# Patient Record
Sex: MALE | Race: BLACK OR AFRICAN AMERICAN | Employment: OTHER | URBAN - METROPOLITAN AREA
[De-identification: names, ages, dates, MRNs, and addresses within clinical notes are randomized per-mention and may not be internally consistent; named-entity substitution may affect disease eponyms.]

---

## 2018-01-25 PROBLEM — I65.29 EXTERNAL CAROTID ARTERY STENOSIS: Status: ACTIVE | Noted: 2018-01-25

## 2023-07-29 PROBLEM — Z95.0 PACEMAKER: Status: ACTIVE | Noted: 2023-07-29

## 2023-10-31 ENCOUNTER — TELEPHONE (OUTPATIENT)
Dept: NEUROSURGERY | Age: 63
End: 2023-10-31

## 2023-10-31 NOTE — TELEPHONE ENCOUNTER
Patient called office after having stopped into office yesterday and stated he does not wish to do any conservative measures such as see pain management or physical therapy because he does not understand why or how this is going to help him. I did explain to the patient that if he did not complete this steps that his insurance would not approve for him to have insurance. Please contact as he wishes to speak with you further.      Thank you

## 2023-10-31 NOTE — TELEPHONE ENCOUNTER
Patient called, answered questions at length, rereviewed CT myelogram with patient. He does not wish to pursue pain management. He wishes to have an appointment with Dr. Rachel Lange. Please call patient to schedule.

## 2023-11-02 ENCOUNTER — OFFICE VISIT (OUTPATIENT)
Dept: PAIN MANAGEMENT | Age: 63
End: 2023-11-02
Payer: COMMERCIAL

## 2023-11-02 VITALS
TEMPERATURE: 97.9 F | SYSTOLIC BLOOD PRESSURE: 139 MMHG | BODY MASS INDEX: 29.29 KG/M2 | HEIGHT: 73 IN | WEIGHT: 221 LBS | OXYGEN SATURATION: 98 % | DIASTOLIC BLOOD PRESSURE: 81 MMHG | HEART RATE: 76 BPM | RESPIRATION RATE: 18 BRPM

## 2023-11-02 DIAGNOSIS — M54.42 CHRONIC BILATERAL LOW BACK PAIN WITH LEFT-SIDED SCIATICA: ICD-10-CM

## 2023-11-02 DIAGNOSIS — M54.16 LUMBAR RADICULOPATHY: ICD-10-CM

## 2023-11-02 DIAGNOSIS — G89.29 CHRONIC BILATERAL LOW BACK PAIN WITH LEFT-SIDED SCIATICA: ICD-10-CM

## 2023-11-02 DIAGNOSIS — M50.90 CERVICAL DISC DISORDER: ICD-10-CM

## 2023-11-02 DIAGNOSIS — M54.12 CERVICAL RADICULITIS: ICD-10-CM

## 2023-11-02 DIAGNOSIS — M54.2 CERVICALGIA: ICD-10-CM

## 2023-11-02 DIAGNOSIS — G89.4 CHRONIC PAIN SYNDROME: Primary | ICD-10-CM

## 2023-11-02 DIAGNOSIS — M43.16 SPONDYLOLISTHESIS OF LUMBAR REGION: ICD-10-CM

## 2023-11-02 PROCEDURE — 99204 OFFICE O/P NEW MOD 45 MIN: CPT | Performed by: PAIN MEDICINE

## 2023-11-02 PROCEDURE — 99205 OFFICE O/P NEW HI 60 MIN: CPT | Performed by: PAIN MEDICINE

## 2023-11-02 RX ORDER — TIZANIDINE 4 MG/1
4 TABLET ORAL EVERY 6 HOURS PRN
COMMUNITY

## 2023-11-03 PROBLEM — M54.2 CERVICALGIA: Status: ACTIVE | Noted: 2023-11-03

## 2023-11-03 PROBLEM — G89.4 CHRONIC PAIN SYNDROME: Status: ACTIVE | Noted: 2023-11-03

## 2023-11-03 PROBLEM — M54.12 CERVICAL RADICULITIS: Status: ACTIVE | Noted: 2023-11-03

## 2023-11-03 PROBLEM — M54.42 CHRONIC BILATERAL LOW BACK PAIN WITH LEFT-SIDED SCIATICA: Status: ACTIVE | Noted: 2023-11-03

## 2023-11-03 PROBLEM — M50.90 CERVICAL DISC DISORDER: Status: ACTIVE | Noted: 2022-06-03

## 2023-11-03 PROBLEM — M43.16 SPONDYLOLISTHESIS OF LUMBAR REGION: Status: ACTIVE | Noted: 2023-11-03

## 2023-11-03 PROBLEM — G89.29 CHRONIC BILATERAL LOW BACK PAIN WITH LEFT-SIDED SCIATICA: Status: ACTIVE | Noted: 2023-11-03

## 2023-11-03 PROBLEM — M54.16 LUMBAR RADICULOPATHY: Status: ACTIVE | Noted: 2023-11-03

## 2023-11-10 ENCOUNTER — NURSE ONLY (OUTPATIENT)
Dept: NON INVASIVE DIAGNOSTICS | Age: 63
End: 2023-11-10

## 2023-11-10 DIAGNOSIS — Z95.0 PACEMAKER: Primary | ICD-10-CM

## 2023-11-10 NOTE — PROGRESS NOTES
See murj report. Patient complains of pain at the pacemaker site. On a scale of 0-10 he states that his pain level is a 3 or 4 at times. He also states he has pain in his left shoulder. He currently sees pain management for bilateral neck and bilateral back pain. He states his left shoulder does have an old rotator cuff injury. Device site: see photo in media. Device site healed well with no signs of infection. Plan:    Will forward to Dr. Jose Villalobos per patient's request.         Satya Denton [address] [facility]

## 2023-11-13 ENCOUNTER — TELEPHONE (OUTPATIENT)
Dept: NON INVASIVE DIAGNOSTICS | Age: 63
End: 2023-11-13

## 2023-11-13 DIAGNOSIS — R52 PAIN: ICD-10-CM

## 2023-11-13 DIAGNOSIS — Z95.0 CARDIAC PACEMAKER IN SITU: Primary | ICD-10-CM

## 2023-11-13 NOTE — TELEPHONE ENCOUNTER
----- Message from Jonas Bowden MD sent at 11/10/2023  3:57 PM EST -----  Can get chest -x ray PA and lateral. Thanks.  ----- Message -----  From: Ellie Cummings RN  Sent: 11/10/2023   3:30 PM EST  To: Jonas Bowden MD    Please review and advise.  Thank you

## 2023-11-16 NOTE — TELEPHONE ENCOUNTER
Called and spoke with patient. Reviewed Dr. Otilia Denis recommendation. Patient agrees to have chest-xray done. Patient instructed to go a [facility] facility. Patient voiced understanding.        [address] [place] [address]

## 2023-11-21 ENCOUNTER — OFFICE VISIT (OUTPATIENT)
Dept: NEUROSURGERY | Age: 63
End: 2023-11-21
Payer: COMMERCIAL

## 2023-11-21 VITALS — BODY MASS INDEX: 29.29 KG/M2 | RESPIRATION RATE: 16 BRPM | HEIGHT: 73 IN | WEIGHT: 221 LBS

## 2023-11-21 DIAGNOSIS — M54.2 NECK PAIN: Primary | ICD-10-CM

## 2023-11-21 PROCEDURE — 99212 OFFICE O/P EST SF 10 MIN: CPT | Performed by: NEUROLOGICAL SURGERY

## 2023-11-21 RX ORDER — GABAPENTIN 300 MG/1
300 CAPSULE ORAL 3 TIMES DAILY
Qty: 90 CAPSULE | Refills: 0 | Status: SHIPPED | OUTPATIENT
Start: 2023-11-21 | End: 2023-12-21

## 2023-11-21 NOTE — PROGRESS NOTES
Patient is here for follow up consult for: neck pain    Physical exam  Alert and Oriented X3  PERRLA, EOMI  DUBOIS 5/5  Sensation intact to LT and PP  Reflexes are 3+ and symmetric  Positive Hoffmanns bilaterally    A/P: patient is here for follow up for: neck pain. 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 We discussed epidurals, PT and gabapentin and PCF. He wishes to try epidurals and gabapentin for now.      Gifty Olsen MD

## 2023-11-29 ENCOUNTER — OFFICE VISIT (OUTPATIENT)
Age: 63
End: 2023-11-29
Payer: COMMERCIAL

## 2023-11-29 ENCOUNTER — HOSPITAL ENCOUNTER (OUTPATIENT)
Dept: GENERAL RADIOLOGY | Age: 63
Discharge: HOME | End: 2023-12-01
Payer: COMMERCIAL

## 2023-11-29 ENCOUNTER — HOSPITAL ENCOUNTER (OUTPATIENT)
Age: 63
Discharge: HOME | End: 2023-12-01
Payer: COMMERCIAL

## 2023-11-29 ENCOUNTER — TELEPHONE (OUTPATIENT)
Dept: NON INVASIVE DIAGNOSTICS | Age: 63
End: 2023-11-29

## 2023-11-29 VITALS
TEMPERATURE: 97.9 F | RESPIRATION RATE: 16 BRPM | HEART RATE: 99 BPM | HEIGHT: 73 IN | WEIGHT: 237.7 LBS | SYSTOLIC BLOOD PRESSURE: 124 MMHG | BODY MASS INDEX: 31.5 KG/M2 | OXYGEN SATURATION: 97 % | DIASTOLIC BLOOD PRESSURE: 68 MMHG

## 2023-11-29 DIAGNOSIS — M54.12 CERVICAL RADICULITIS: ICD-10-CM

## 2023-11-29 DIAGNOSIS — Z12.11 SCREENING FOR COLORECTAL CANCER: Primary | ICD-10-CM

## 2023-11-29 DIAGNOSIS — Z95.0 CARDIAC PACEMAKER IN SITU: ICD-10-CM

## 2023-11-29 DIAGNOSIS — M53.9 MULTILEVEL DEGENERATIVE DISC DISEASE: ICD-10-CM

## 2023-11-29 DIAGNOSIS — Z12.12 SCREENING FOR COLORECTAL CANCER: Primary | ICD-10-CM

## 2023-11-29 DIAGNOSIS — M48.02 SPINAL STENOSIS IN CERVICAL REGION: ICD-10-CM

## 2023-11-29 DIAGNOSIS — M43.16 SPONDYLOLISTHESIS OF LUMBAR REGION: ICD-10-CM

## 2023-11-29 DIAGNOSIS — N40.0 BENIGN PROSTATIC HYPERPLASIA WITHOUT LOWER URINARY TRACT SYMPTOMS: ICD-10-CM

## 2023-11-29 DIAGNOSIS — R52 PAIN: ICD-10-CM

## 2023-11-29 PROCEDURE — 99203 OFFICE O/P NEW LOW 30 MIN: CPT | Performed by: FAMILY MEDICINE

## 2023-11-29 PROCEDURE — 71046 X-RAY EXAM CHEST 2 VIEWS: CPT

## 2023-11-29 RX ORDER — TAMSULOSIN HYDROCHLORIDE 0.4 MG/1
0.4 CAPSULE ORAL 2 TIMES DAILY
COMMUNITY
Start: 2023-11-09

## 2023-11-29 SDOH — ECONOMIC STABILITY: FOOD INSECURITY: WITHIN THE PAST 12 MONTHS, THE FOOD YOU BOUGHT JUST DIDN'T LAST AND YOU DIDN'T HAVE MONEY TO GET MORE.: NEVER TRUE

## 2023-11-29 SDOH — ECONOMIC STABILITY: FOOD INSECURITY: WITHIN THE PAST 12 MONTHS, YOU WORRIED THAT YOUR FOOD WOULD RUN OUT BEFORE YOU GOT MONEY TO BUY MORE.: NEVER TRUE

## 2023-11-29 SDOH — ECONOMIC STABILITY: INCOME INSECURITY: HOW HARD IS IT FOR YOU TO PAY FOR THE VERY BASICS LIKE FOOD, HOUSING, MEDICAL CARE, AND HEATING?: NOT HARD AT ALL

## 2023-11-29 SDOH — ECONOMIC STABILITY: HOUSING INSECURITY
IN THE LAST 12 MONTHS, WAS THERE A TIME WHEN YOU DID NOT HAVE A STEADY PLACE TO SLEEP OR SLEPT IN A SHELTER (INCLUDING NOW)?: NO

## 2023-11-29 ASSESSMENT — PATIENT HEALTH QUESTIONNAIRE - PHQ9
3. TROUBLE FALLING OR STAYING ASLEEP: 1
6. FEELING BAD ABOUT YOURSELF - OR THAT YOU ARE A FAILURE OR HAVE LET YOURSELF OR YOUR FAMILY DOWN: 1
8. MOVING OR SPEAKING SO SLOWLY THAT OTHER PEOPLE COULD HAVE NOTICED. OR THE OPPOSITE, BEING SO FIGETY OR RESTLESS THAT YOU HAVE BEEN MOVING AROUND A LOT MORE THAN USUAL: 0
SUM OF ALL RESPONSES TO PHQ QUESTIONS 1-9: 12
SUM OF ALL RESPONSES TO PHQ QUESTIONS 1-9: 12
10. IF YOU CHECKED OFF ANY PROBLEMS, HOW DIFFICULT HAVE THESE PROBLEMS MADE IT FOR YOU TO DO YOUR WORK, TAKE CARE OF THINGS AT HOME, OR GET ALONG WITH OTHER PEOPLE: 0
SUM OF ALL RESPONSES TO PHQ QUESTIONS 1-9: 12
SUM OF ALL RESPONSES TO PHQ9 QUESTIONS 1 & 2: 4
2. FEELING DOWN, DEPRESSED OR HOPELESS: 2
4. FEELING TIRED OR HAVING LITTLE ENERGY: 3
5. POOR APPETITE OR OVEREATING: 0
7. TROUBLE CONCENTRATING ON THINGS, SUCH AS READING THE NEWSPAPER OR WATCHING TELEVISION: 3
SUM OF ALL RESPONSES TO PHQ QUESTIONS 1-9: 12
1. LITTLE INTEREST OR PLEASURE IN DOING THINGS: 2
9. THOUGHTS THAT YOU WOULD BE BETTER OFF DEAD, OR OF HURTING YOURSELF: 0

## 2023-11-29 ASSESSMENT — ENCOUNTER SYMPTOMS
SHORTNESS OF BREATH: 0
COUGH: 0
NAUSEA: 0
VOMITING: 0
WHEEZING: 0
DIARRHEA: 0
CONSTIPATION: 0
ABDOMINAL PAIN: 0

## 2023-11-29 NOTE — TELEPHONE ENCOUNTER
LM to call office for results.     Electronically signed by Rashida Mayen MA on 11/29/2023 at 3:20 PM

## 2023-11-29 NOTE — TELEPHONE ENCOUNTER
----- Message from Sima Quintero MD sent at 11/29/2023  3:01 PM EST -----  CXR looks fine.  Thanks.  ----- Message -----  From: Pearl Allen Incoming Radiant Results From [employer]  Sent: 11/29/2023   2:58 PM EST  To: Sima Quintero MD

## 2023-11-29 NOTE — PROGRESS NOTES
agrees with above counseling, assessment and plan. All questions answered    Additional plan and future considerations:   RTO in 1mos    Return to Office: Return in about 4 weeks (around 12/27/2023).     Electronically signed by Griselda Merlos MD on 11/29/2023 at 12:26 PM

## 2023-11-30 ENCOUNTER — TELEPHONE (OUTPATIENT)
Dept: NON INVASIVE DIAGNOSTICS | Age: 63
End: 2023-11-30

## 2023-11-30 NOTE — TELEPHONE ENCOUNTER
Reviewed carelink from 11.30.2023  Real time NSR @ 70   No arrhythmias that correlate to his symptoms. 1 NSVT: on 11.12.2023. 9 beats. 1 SVT:  on 11.18.2023 duration: 1 minute 3 seconds.        [address] [place] [address]

## 2023-11-30 NOTE — TELEPHONE ENCOUNTER
Patient called to report, starting last night he started having SOB, cold sweats, and sharp, burning, shock feeling pain in the middle of his back that radiates toward his heart and under left arm. The patient has been advised to send a remote transmission and to go to ED if symptoms worsen or he feels it to be necessary. Message sent to Dr. Kareem Mitchell and device nurse.      Electronically signed by Nabila Garcia MA on 11/30/2023 at 10:30 AM

## 2023-12-29 ENCOUNTER — OFFICE VISIT (OUTPATIENT)
Age: 63
End: 2023-12-29
Payer: COMMERCIAL

## 2023-12-29 VITALS
HEIGHT: 73 IN | RESPIRATION RATE: 18 BRPM | OXYGEN SATURATION: 97 % | BODY MASS INDEX: 31.41 KG/M2 | SYSTOLIC BLOOD PRESSURE: 106 MMHG | HEART RATE: 70 BPM | DIASTOLIC BLOOD PRESSURE: 62 MMHG | TEMPERATURE: 97.2 F | WEIGHT: 237 LBS

## 2023-12-29 DIAGNOSIS — Z11.59 NEED FOR HEPATITIS C SCREENING TEST: ICD-10-CM

## 2023-12-29 DIAGNOSIS — R73.09 ELEVATED RANDOM BLOOD GLUCOSE LEVEL: ICD-10-CM

## 2023-12-29 DIAGNOSIS — E78.2 MIXED HYPERLIPIDEMIA: Primary | ICD-10-CM

## 2023-12-29 DIAGNOSIS — Z95.0 CARDIAC PACEMAKER IN SITU: ICD-10-CM

## 2023-12-29 DIAGNOSIS — Z11.4 SCREENING FOR HIV WITHOUT PRESENCE OF RISK FACTORS: ICD-10-CM

## 2023-12-29 DIAGNOSIS — Z00.00 HEALTHCARE MAINTENANCE: ICD-10-CM

## 2023-12-29 DIAGNOSIS — E03.9 HYPOTHYROIDISM, UNSPECIFIED TYPE: ICD-10-CM

## 2023-12-29 DIAGNOSIS — Z13.1 ENCOUNTER FOR SCREENING FOR DIABETES MELLITUS: ICD-10-CM

## 2023-12-29 DIAGNOSIS — E55.9 VITAMIN D DEFICIENCY: ICD-10-CM

## 2023-12-29 DIAGNOSIS — M48.02 SPINAL STENOSIS IN CERVICAL REGION: ICD-10-CM

## 2023-12-29 LAB
ABSOLUTE IMMATURE GRANULOCYTE: <0.03 K/UL (ref 0–0.58)
BASOPHILS ABSOLUTE: 0.01 K/UL (ref 0–0.2)
BASOPHILS RELATIVE PERCENT: 0 % (ref 0–2)
CHOLESTEROL: 178 MG/DL
EOSINOPHILS ABSOLUTE: 0.04 K/UL (ref 0.05–0.5)
EOSINOPHILS RELATIVE PERCENT: 1 % (ref 0–6)
HBA1C MFR BLD: 5.1 % (ref 4–5.6)
HCT VFR BLD CALC: 45.4 % (ref 37–54)
HDLC SERPL-MCNC: 43 MG/DL
HEMOGLOBIN: 15 G/DL (ref 12.5–16.5)
IMMATURE GRANULOCYTES: 1 % (ref 0–5)
LDL CHOLESTEROL: 125 MG/DL
LYMPHOCYTES ABSOLUTE: 1.35 K/UL (ref 1.5–4)
LYMPHOCYTES RELATIVE PERCENT: 39 % (ref 20–42)
MCH RBC QN AUTO: 31.3 PG (ref 26–35)
MCHC RBC AUTO-ENTMCNC: 33 G/DL (ref 32–34.5)
MCV RBC AUTO: 94.8 FL (ref 80–99.9)
MONOCYTES ABSOLUTE: 0.41 K/UL (ref 0.1–0.95)
MONOCYTES RELATIVE PERCENT: 12 % (ref 2–12)
NEUTROPHILS ABSOLUTE: 1.61 K/UL (ref 1.8–7.3)
NEUTROPHILS RELATIVE PERCENT: 47 % (ref 43–80)
PDW BLD-RTO: 11.9 % (ref 11.5–15)
PLATELET CONFIRMATION: NORMAL
PLATELET, FLUORESCENCE: 129 K/UL (ref 130–450)
PMV BLD AUTO: 12.3 FL (ref 7–12)
RBC # BLD: 4.79 M/UL (ref 3.8–5.8)
RBC # BLD: ABNORMAL 10*6/UL
TRIGL SERPL-MCNC: 48 MG/DL
TSH SERPL DL<=0.05 MIU/L-ACNC: 3.02 UIU/ML (ref 0.27–4.2)
VITAMIN D 25-HYDROXY: 29.5 NG/ML (ref 30–100)
VLDLC SERPL CALC-MCNC: 10 MG/DL
WBC # BLD: 3.4 K/UL (ref 4.5–11.5)

## 2023-12-29 PROCEDURE — 99214 OFFICE O/P EST MOD 30 MIN: CPT | Performed by: FAMILY MEDICINE

## 2023-12-29 RX ORDER — TIZANIDINE 4 MG/1
4 TABLET ORAL EVERY 6 HOURS PRN
Qty: 90 TABLET | Refills: 1 | Status: SHIPPED | OUTPATIENT
Start: 2023-12-29

## 2023-12-29 RX ORDER — GABAPENTIN 300 MG/1
300 CAPSULE ORAL 3 TIMES DAILY
Qty: 270 CAPSULE | Refills: 1 | Status: SHIPPED | OUTPATIENT
Start: 2023-12-29 | End: 2024-06-26

## 2023-12-29 NOTE — PROGRESS NOTES
[facility]  DATE OF VISIT : 2023    Patient : Viktor Arguelles   Age : 58 y.o.  : 1960   MRN : [id number]   ______________________________________________________________________    Chief Complaint :   Chief Complaint   Patient presents with    1 Month Follow-Up     Patient presenty today for a 1 month follow up. Patient states the Gabapentin is working great. HPI : Viktor Arguelles is 58 y.o. male who presented to the clinic today for OV. Second-degree heart block status post pacemaker insertion on 2023: Continues to follow with EP. Device check last done 11/10. Only on ASA. Had xray done to verify pacemaker placement  which was normal.    Chronic back and neck pain: Following with pain management and neurosurgery. History of C3-C7 anterior fusion. Last seen by neurosurgery 10/21 and pain management . Doing well with the gabapentin. I reviewed the patient's past medications, allergies and past medical history during this visit. Past Medical History :    Past Medical History:   Diagnosis Date    Cervical fusion syndrome     Cervical radiculopathy     Cervical spinal stenosis     severe    DDD (degenerative disc disease)     Enlarged prostate     External carotid artery stenosis 2018    Headache     History of head injury     Hyperlipidemia     treated with diet before    Low back pain     Lumbar radiculopathy     Prominent abdominal aortic pulse 2018    Right rotator cuff tear     Syncope, near tilt table test neg 2011    felt light headed, unsteady gait, dizziness increased when patient went from standing to sitting. entire episode lasted about 30 min.      Past Surgical History:   Procedure Laterality Date    CERVICAL FUSION      ACDF C5-7 about 30 years ago    CERVICAL FUSION Bilateral 2022    EXPLORATION OF C5-C7 FUSION, C3-C4 AND C4-C5 ANTERIOR CERVICAL DISCECTOMY AND FUSION performed by Og Medina MD at [facility]

## 2023-12-31 NOTE — PROGRESS NOTES
Chief Complaint/Problem Status:Follow up on     Bipolar disorder  Anxiety    History: Chart reviewed, progress gathered, case discussed with multidisciplinary team, patient seen, appropriate support/counselling/ therapy provided.    Feeling little better.  Reports improvement in severity of anxiety and depression.  Feels less hopeless helpless and miserable.  Still staying in bed.  Encouraged him to go to some groups.  Has been in touch with wife and daughter.  Reports improvement in sleep and appetite.    Medication Side Effects: absent  Sleep:  Better  Appetite:  Better  Groups:no    PHQ9:    Last four PHQ 2/9 Test Results  0: Not at all  1: Several days  2: More than half the days  3: Nearly every day          12/27/2023     2:38 PM 7/17/2023     1:17 AM 12/13/2022     8:28 AM 3/28/2022    12:58 PM   PHQ 2 Score   Adult PHQ 2 Score 6 6 0 4   Adult PHQ 2 Interpretation Further screening needed Further screening needed No further screening needed Further screening needed   Little interest or pleasure in activity? 3 3 0 2         12/27/2023     2:38 PM 7/17/2023     1:17 AM 3/28/2022    12:58 PM 7/13/2020     9:16 AM   PHQ 9 Score   Adult PHQ 9 Score 14 11 9 17   Adult PHQ 9 Interpretation Moderate Depression Moderate Depression Mild Depression Moderately Severe Depression          Last four GAD7 Assessments           1/1/2023     1:17 AM 1/1/2023     1:15 AM   GAD7 Screening   Date/time completed by patient via [employer] 1/1/2023  1:17 AM 1/1/2023  1:15 AM         CIWA Scores:   No data found.           Examination:     VITALS:  Visit Vitals  /75 (BP Location: LUE - Left upper extremity, Patient Position: Sitting)   Pulse 76   Temp 97.5 °F (36.4 °C) (Oral)   Resp 18   Ht 5' 8\" (1.727 m)   Wt 94.8 kg (209 lb)   SpO2 95% Comment: rechecked pulse ox after morning vital signs   BMI 31.78 kg/m²       MENTAL STATUS EXAM  General appearance: well-nourished  Grooming: disheveled  Attitude: Cooperative  Speech: 
Cynthia Hammer presents to the [address] [place] [address] on 11/2/2023. Otilia Steele is complaining of pain LOW BACK, RADIATES TO BLE AND NECK RADIATES TO BUE. The pain is intermittent. The pain is described as aching, throbbing, stabbing, sharp, and penetrating. Pain is rated on his best day at a 1, on his worst day at a 9, and on average at a 7 on the VAS scale. He took his last dose of Zanaflex A FEW DAYS AGO. Any procedures since your last visit: No  He is not on NSAIDS and  is  on anticoagulation medications to include ASA and is managed by PCP. Pacemaker or defibrillator: Yes Physician managing device is CARDIOLOGY    Medication Contract and Consent for Opioid Use Documents Filed       Patient Documents       Type of Document Status Date Received Received By Description    Medication Contract Received 5/27/2022 12:08 PM WOOD, MESERET Medication Contract                       There were no vitals taken for this visit. No LMP for male patient.
fluent  Mood/affect:  Less anxious and constricted  Psychomotor: slow  Thought process: intact  Associations/ Thought content: unremarkable  Perceptual disorders/hallucinations: none  Level of consciousness: alert  Orientation: oriented to person, place, time, and general circumstances  Attention/ Concentration: ability to maintain attention  Fund of knowledge/ Intelligence: average  Memory: no apparent impairments in short term memory and no apparent impairments in long term memory   Insight: fair, as evidenced by patient's insight into his own illness  Judgment: fair, as evidenced by engagement in treatment  Suicidal thoughts: denies  Homicidal thoughts: no  Language:intact  Gait/Station:normal    Medical Decision Making     Bipolar disorder  Anxiety    Impression:  Early signs of improvement    New problem: as above    Medication changes: see orders    New Labs: see orders    Legal Voluntary    Risk of Assessment for Harm to Self/Suicide risk:  Low to moderate risk unless hospitalization continues    Risk of Assessment of Harm to Others: low    Risk of vulnerability: mildly to moderately elevated relative risk unless hospitalization continues    Withdrawal risk: none    Recommended After Care : follow up with out pt psychiatrist and therapist and follow up at [facility]    Reasons for continued hospitalization: continued treatment of affective sx's    Recent Lab study results:     CBC with differential  Recent Labs     11/04/23 2012   WBC 4.2   RBC 5.06      SEG 61   TLYMPH 28   PMON 9   PEOS 1   PBASO 1   ANEUT 2.6   ALYMS 1.2   TAINA 0.4   AEOS 0.0   ABASO 0.0       Comprehensive metabolic panel  Recent Labs     07/16/23  1640 07/17/23  0837 11/04/23 2012   SODIUM 136   < > 137   POTASSIUM 4.4   < > 3.8   CHLORIDE 100   < > 104   CO2 27   < > 24   ANIONGAP 13   < > 13   GLUCOSE 105*   < > 91   BUN 27*   < > 16   CREATININE 1.21*   < > 1.19*   CALCIUM 9.0   < > 9.3   ALBUMIN 2.2*   < > 3.4* 
  BILIRUBIN 0.4   < > 0.5   ALKPT 118*   < > 66   LACTA 1.1  --   --    AST 54*   < > 16   GPT 82*   < > 17   GLOB 6.3*   < > 3.9   AGR 0.3*   < > 0.9*    < > = values in this interval not displayed.       Recent Labs     11/04/23 2012   GLUCOSE 91       Thyroid function tests  Recent Labs     11/04/23 2012   TSH 0.833       POC Breath Alcohol testing result:       Hepatitis serology result:        POC Urine Drug Screen Results  Recent Labs     12/27/23  2100   POCAMP Negative   POCBAR Negative   POCBEN Positive   POCBUR Negative   POCCOC Negative   POCMARIJ Positive   POCMETHAMP Negative   POCMETHYLEN Negative   POCOPI Positive   POCMETHA Negative   POCOXY Negative   PCP Negative   TCA Negative   POCTEMP 90   INTERNAL Yes        No results found for: \"LITHIUM\"   Lab Results   Component Value Date    VALP 26 (L) 07/16/2023     No results found for: \"CARBAM\"    No results found for: \"CPK\"  Hemoglobin A1C (%)   Date Value   07/08/2023 5.7 (H)       LIPID PANEL  Cholesterol (mg/dL)   Date Value   01/17/2022 143      HDL (mg/dL)   Date Value   01/17/2022 50      Cholesterol/ HDL Ratio (no units)   Date Value   01/17/2022 2.9      Triglycerides (mg/dL)   Date Value   01/17/2022 124      LDL (mg/dL)   Date Value   01/17/2022 68     No results found for this or any previous visit.    Urine Panel  No results for input(s): \"UOSM\", \"UK\", \"5UNITR\", \"UCROA\", \"UCL\", \"YE\", \"UKET\", \"USPG\", \"UPROT\", \"UWBC\", \"URBC\", \"UBILI\", \"UPH\", \"UURO\", \"UBACTR\", \"UTPELC\" in the last 72 hours.    Invalid input(s): \"SPGRAVITY\"    Latest radiology results: No results found.    Current Facility-Administered Medications   Medication    LORazepam (ATIVAN) tablet 1 mg    OLANZapine (ZyPREXA ZYDIS) disintegrating tablet 5 mg    QUEtiapine (SEROquel) tablet 50 mg    escitalopram (LEXAPRO) tablet 10 mg    melatonin tablet 3 mg    doxepin (SINEquan) capsule 10 mg    propRANolol (INDERAL) tablet 20 mg    aspirin (ECOTRIN) enteric coated tablet 81 mg    
History of head injury     Hyperlipidemia     treated with diet before    Low back pain     Lumbar radiculopathy     Prominent abdominal aortic pulse 2018    Right rotator cuff tear     Syncope, near tilt table test neg 2011    felt light headed, unsteady gait, dizziness increased when patient went from standing to sitting. entire episode lasted about 30 min. Past Surgical History:   Procedure Laterality Date    CERVICAL FUSION      ACDF C5-7 about 30 years ago    CERVICAL FUSION Bilateral 2022    EXPLORATION OF C5-C7 FUSION, C3-C4 AND C4-C5 ANTERIOR CERVICAL DISCECTOMY AND FUSION performed by Raji Crespo MD at [facility] Left 2023    Medtronic Dual PPM  Dr. Jose Charlton Right 2015    Dr Aliene Goodell      perforated ulcer repair, and later removal of adhesions       Prior to Admission medications    Medication Sig Start Date End Date Taking? Authorizing Provider   tiZANidine (ZANAFLEX) 4 MG tablet Take 1 tablet by mouth every 6 hours as needed   Yes Sandra Moroe MD   aspirin 81 MG EC tablet Take 1 tablet by mouth daily   Yes Sandra Moore MD   Multiple Vitamins-Minerals (ONE-A-DAY MENS 50+ ADVANTAGE) TABS Take 1 tablet by mouth daily   Yes Sandra Moore MD       No Known Allergies    Social History     Socioeconomic History    Marital status:      Spouse name: Not on file    Number of children: Not on file    Years of education: Not on file    Highest education level: Not on file   Occupational History    Not on file   Tobacco Use    Smoking status: Former     Packs/day: 1.00     Years: 4.00     Additional pack years: 0.00     Total pack years: 4.00     Types: Cigarettes     Quit date: 1970     Years since quittin.8    Smokeless tobacco: Never   Vaping Use    Vaping Use: Never used   Substance and Sexual Activity    Alcohol use:  Yes     Alcohol/week: 1.0 standard
lisinopril (ZESTRIL) tablet 10 mg    atorvastatin (LIPITOR) tablet 20 mg    pantoprazole (PROTONIX) EC tablet 40 mg    ezetimibe (ZETIA) tablet 10 mg    traZODone (DESYREL) tablet 50 mg    benztropine mesylate (COGENTIN) 1 MG/ML injection 1 mg    Or    benztropine (COGENTIN) tablet 1 mg    hydrOXYzine (ATARAX) tablet 50 mg    OLANZapine (ZyPREXA ZYDIS) disintegrating tablet 5 mg    LORazepam (ATIVAN) injection 1 mg    Or    LORazepam (ATIVAN) tablet 1 mg    haloperidol lactate (HALDOL) 5 MG/ML short-acting injection 2 mg    Or    haloperidol (HALDOL) tablet 10 mg    ibuprofen (MOTRIN) tablet 600 mg    Or    ibuprofen (MOTRIN) tablet 400 mg    Or    ibuprofen (MOTRIN) tablet 600 mg    Or    ibuprofen (MOTRIN) tablet 800 mg    loperamide (IMODIUM) capsule 2 mg    ondansetron (ZOFRAN ODT) disintegrating tablet 4 mg    aluminum-magnesium hydroxide-simethicone (MAALOX) 200-200-20 MG/5ML suspension 30 mL    docusate sodium-sennosides (SENOKOT S) 50-8.6 MG 2 tablet    bisacodyl (DULCOLAX) suppository 10 mg    magnesium hydroxide (MILK OF MAGNESIA) 400 MG/5ML suspension 30 mL    polyethylene glycol (MIRALAX) packet 17 g    nicotine polacrilex (NICORETTE) gum 2 mg    nicotine (NICODERM) 14 MG/24HR patch 1 patch    cloNIDine (CATAPRES) tablet 0.1 mg

## 2024-01-02 ENCOUNTER — HOSPITAL ENCOUNTER (OUTPATIENT)
Dept: PHYSICAL THERAPY | Age: 64
Setting detail: THERAPIES SERIES
Discharge: HOME | End: 2024-01-02

## 2024-01-02 ENCOUNTER — OFFICE VISIT (OUTPATIENT)
Dept: SURGERY | Age: 64
End: 2024-01-02
Payer: COMMERCIAL

## 2024-01-02 VITALS
SYSTOLIC BLOOD PRESSURE: 139 MMHG | TEMPERATURE: 98 F | DIASTOLIC BLOOD PRESSURE: 80 MMHG | OXYGEN SATURATION: 100 % | HEART RATE: 88 BPM | WEIGHT: 237 LBS | HEIGHT: 73 IN | BODY MASS INDEX: 31.41 KG/M2

## 2024-01-02 DIAGNOSIS — Z86.010 HISTORY OF COLON POLYPS: Primary | ICD-10-CM

## 2024-01-02 PROBLEM — Z86.0100 HISTORY OF COLON POLYPS: Status: ACTIVE | Noted: 2024-01-02

## 2024-01-02 LAB
HEPATITIS C ANTIBODY: NONREACTIVE
HIV AG/AB: NONREACTIVE

## 2024-01-02 PROCEDURE — 99202 OFFICE O/P NEW SF 15 MIN: CPT | Performed by: SURGERY

## 2024-01-02 ASSESSMENT — ENCOUNTER SYMPTOMS
DIARRHEA: 0
CONSTIPATION: 0
BLOOD IN STOOL: 0
RESPIRATORY NEGATIVE: 1

## 2024-01-02 NOTE — PROGRESS NOTES
Chief Complaint   Patient presents with    New Patient     Ref by Danny for screening for colorectal cancer         Assessment:  1. History of colon polyps           Plan:  He is not yet due for a surveillance colonoscopy  Return for recommend colonoscopy in March 2027 .    History    HPI:  Javy Ca presents today for a possible colonoscopy.  He has some chronic, stable LLQ pain that has improved over the last one year.  He denies change in his bowel habits, blood in stool.  There is no FMH of colorectal cancer.  His last colonoscopy was NORMAL in March 2022; however, Dr. Santillan recommended annual stool studies and a repeat colonoscopy in 3 years.      Past Medical History:   Diagnosis Date    Cervical fusion syndrome     Cervical radiculopathy     Cervical spinal stenosis     severe    DDD (degenerative disc disease)     Enlarged prostate     External carotid artery stenosis 01/25/2018    Headache     History of colonic polyps     cscope 3/2022 showed no polyps    History of head injury     Hyperlipidemia     treated with diet before    Low back pain     Lumbar radiculopathy     Prominent abdominal aortic pulse 01/25/2018    Right rotator cuff tear     Syncope, near tilt table test neg 09/02/2011    felt light headed, unsteady gait, dizziness increased when patient went from standing to sitting. entire episode lasted about 30 min.     Past Surgical History:   Procedure Laterality Date    ABDOMINAL ADHESION SURGERY      CERVICAL FUSION      ACDF C5-7 about 30 years ago    CERVICAL FUSION Bilateral 06/03/2022    EXPLORATION OF C5-C7 FUSION, C3-C4 AND C4-C5 ANTERIOR CERVICAL DISCECTOMY AND FUSION performed by Ana Saunders MD at [facility] OR    CLAVICLE SURGERY      COLONOSCOPY  03/2022    Dr. Santillan    COLONOSCOPY      approx    PACEMAKER INSERTION Left 07/28/2023    Medtronic Dual PPM  Dr. Rosales    ROTATOR CUFF REPAIR Right 05/19/2015    Dr Alex    STOMACH SURGERY      perforated ulcer repair, and

## 2024-01-02 NOTE — PROGRESS NOTES
[facility]                Phone: [phone]  Fax: [phone]    Physical Therapy  Cancellation/No-show Note  Patient Name:  Javy Ca  :  1960   Date:  2024    For today's appointment patient:  [x]  Cancelled  []  Rescheduled appointment  []  No-show     Reason given by patient:  []  Patient ill  []  Conflicting appointment  []  No transportation    []  Conflict with work  [x]  No reason given  []  Other:     Comments:  pt cancelled initial evaluation.      Electronically signed by:  Jose Nance PT

## 2024-01-26 ENCOUNTER — TELEPHONE (OUTPATIENT)
Dept: CARDIOLOGY CLINIC | Age: 64
End: 2024-01-26

## 2024-02-06 DIAGNOSIS — M48.02 SPINAL STENOSIS IN CERVICAL REGION: ICD-10-CM

## 2024-02-07 RX ORDER — TIZANIDINE 4 MG/1
4 TABLET ORAL EVERY 6 HOURS PRN
Qty: 90 TABLET | Refills: 1 | Status: SHIPPED | OUTPATIENT
Start: 2024-02-07

## 2024-02-19 ENCOUNTER — OFFICE VISIT (OUTPATIENT)
Dept: NON INVASIVE DIAGNOSTICS | Age: 64
End: 2024-02-19
Payer: COMMERCIAL

## 2024-02-19 VITALS
HEIGHT: 73 IN | HEART RATE: 80 BPM | BODY MASS INDEX: 30.48 KG/M2 | OXYGEN SATURATION: 96 % | WEIGHT: 230 LBS | SYSTOLIC BLOOD PRESSURE: 110 MMHG | DIASTOLIC BLOOD PRESSURE: 62 MMHG | RESPIRATION RATE: 16 BRPM

## 2024-02-19 DIAGNOSIS — R07.9 CHEST PAIN IN ADULT: ICD-10-CM

## 2024-02-19 DIAGNOSIS — Z95.0 CARDIAC PACEMAKER IN SITU: Primary | ICD-10-CM

## 2024-02-19 PROCEDURE — G8427 DOCREV CUR MEDS BY ELIG CLIN: HCPCS | Performed by: NURSE PRACTITIONER

## 2024-02-19 PROCEDURE — G8484 FLU IMMUNIZE NO ADMIN: HCPCS | Performed by: NURSE PRACTITIONER

## 2024-02-19 PROCEDURE — 93280 PM DEVICE PROGR EVAL DUAL: CPT | Performed by: NURSE PRACTITIONER

## 2024-02-19 PROCEDURE — 1036F TOBACCO NON-USER: CPT | Performed by: NURSE PRACTITIONER

## 2024-02-19 PROCEDURE — 99214 OFFICE O/P EST MOD 30 MIN: CPT | Performed by: NURSE PRACTITIONER

## 2024-02-19 PROCEDURE — G8417 CALC BMI ABV UP PARAM F/U: HCPCS | Performed by: NURSE PRACTITIONER

## 2024-02-19 PROCEDURE — 93000 ELECTROCARDIOGRAM COMPLETE: CPT | Performed by: INTERNAL MEDICINE

## 2024-02-19 PROCEDURE — 3017F COLORECTAL CA SCREEN DOC REV: CPT | Performed by: NURSE PRACTITIONER

## 2024-02-19 RX ORDER — REGADENOSON 0.08 MG/ML
0.4 INJECTION, SOLUTION INTRAVENOUS
OUTPATIENT
Start: 2024-02-19

## 2024-02-19 NOTE — PROGRESS NOTES
QTc 386, low voltage  please see scan in Cardiology.      TTE (07/25/2023):   Normal left ventricular chamber size.   Normal left ventricular systolic function.   Visually estimated LVEF is 55-60 %.   No wall motion abnormalities.   Indeterminate diastolic function.   Normal right ventricle structure and function.   Normal left atrial size.   Normal right atrial size   Unable to estimate PA pressure.        TTE 09/19/2017:    Aorta   Normal aortic root and ascending aorta.   Miscellaneous   The inferior vena cava diameter is normal with normal respiratory   variation.      Conclusions      Summary   Stage II diastolic dysfunction.   Otherwise normal study.      Signature      ----------------------------------------------------------------   Electronically signed by Liz Montalvo MD(Interpreting   physician) on 09/19/2017 08:56 AM   ----------------------------------------------------------------     Stress Test 12/28/23:      FINDINGS: The overall quality of the study was excellent.      Left ventricular cavity size was noted to be normal.     Rotational analog analysis demonstrated no patient motion or abnormal extracardiac radioactivity.        The gated SPECT stress imaging in the short, vertical long, and horizontal long axis demonstrated normal homogeneous tracer distribution throughout the myocardium.              The resting images show no change.      Gated SPECT left ventricular ejection fraction was calculated to be 58%, with normal myocardial thickening and wall motion.     Impression:     Exercise ekg negative.  The patient experienced no chest pain with exercise.   The myocardial perfusion imaging was normal.    Overall left ventricular systolic function was normal without regional wall motion abnormalities.  Exercise capacity was average.    Low risk general exercise nuclear treadmill test.     Thank you for sending your patient to this [facility].      Electronically signed

## 2024-02-21 ENCOUNTER — TELEPHONE (OUTPATIENT)
Dept: CARDIOLOGY | Age: 64
End: 2024-02-21

## 2024-02-21 NOTE — TELEPHONE ENCOUNTER
Left message to schedule Lexiscan stress test.  Electronically signed by Cecilia Copeland on 2/21/2024 at 11:55 AM

## 2024-03-07 ENCOUNTER — TELEPHONE (OUTPATIENT)
Dept: CARDIOLOGY | Age: 64
End: 2024-03-07

## 2024-03-07 NOTE — TELEPHONE ENCOUNTER
Left message on wife's voicemail to confirm stress test for Monday, 3/11/24, starting at 0700.  Instructions given and medications reviewed.  Patient instructed no medication holds.  Advised to call with any questions.

## 2024-03-11 ENCOUNTER — HOSPITAL ENCOUNTER (OUTPATIENT)
Dept: CARDIOLOGY | Age: 64
Discharge: HOME | End: 2024-03-13
Payer: COMMERCIAL

## 2024-03-11 VITALS
WEIGHT: 230 LBS | SYSTOLIC BLOOD PRESSURE: 134 MMHG | RESPIRATION RATE: 18 BRPM | HEART RATE: 68 BPM | HEIGHT: 73 IN | BODY MASS INDEX: 30.48 KG/M2 | DIASTOLIC BLOOD PRESSURE: 70 MMHG

## 2024-03-11 DIAGNOSIS — R07.9 CHEST PAIN, UNSPECIFIED TYPE: Primary | ICD-10-CM

## 2024-03-11 DIAGNOSIS — R07.9 CHEST PAIN IN ADULT: ICD-10-CM

## 2024-03-11 PROCEDURE — A9500 TC99M SESTAMIBI: HCPCS | Performed by: INTERNAL MEDICINE

## 2024-03-11 PROCEDURE — 93017 CV STRESS TEST TRACING ONLY: CPT

## 2024-03-11 PROCEDURE — 2580000003 HC RX 258: Performed by: INTERNAL MEDICINE

## 2024-03-11 PROCEDURE — 3430000000 HC RX DIAGNOSTIC RADIOPHARMACEUTICAL: Performed by: INTERNAL MEDICINE

## 2024-03-11 PROCEDURE — 6360000002 HC RX W HCPCS: Performed by: NURSE PRACTITIONER

## 2024-03-11 RX ORDER — TETRAKIS(2-METHOXYISOBUTYLISOCYANIDE)COPPER(I) TETRAFLUOROBORATE 1 MG/ML
35.6 INJECTION, POWDER, LYOPHILIZED, FOR SOLUTION INTRAVENOUS
Status: COMPLETED | OUTPATIENT
Start: 2024-03-11 | End: 2024-03-11

## 2024-03-11 RX ORDER — TETRAKIS(2-METHOXYISOBUTYLISOCYANIDE)COPPER(I) TETRAFLUOROBORATE 1 MG/ML
10.5 INJECTION, POWDER, LYOPHILIZED, FOR SOLUTION INTRAVENOUS
Status: DISCONTINUED | OUTPATIENT
Start: 2024-03-11 | End: 2024-03-11

## 2024-03-11 RX ORDER — SODIUM CHLORIDE 0.9 % (FLUSH) 0.9 %
10 SYRINGE (ML) INJECTION PRN
Status: DISCONTINUED | OUTPATIENT
Start: 2024-03-11 | End: 2024-03-14 | Stop reason: HOSPADM

## 2024-03-11 RX ORDER — REGADENOSON 0.08 MG/ML
0.4 INJECTION, SOLUTION INTRAVENOUS
Status: COMPLETED | OUTPATIENT
Start: 2024-03-11 | End: 2024-03-11

## 2024-03-11 RX ORDER — TETRAKIS(2-METHOXYISOBUTYLISOCYANIDE)COPPER(I) TETRAFLUOROBORATE 1 MG/ML
10.3 INJECTION, POWDER, LYOPHILIZED, FOR SOLUTION INTRAVENOUS
Status: COMPLETED | OUTPATIENT
Start: 2024-03-11 | End: 2024-03-11

## 2024-03-11 RX ADMIN — SODIUM CHLORIDE, PRESERVATIVE FREE 10 ML: 5 INJECTION INTRAVENOUS at 07:21

## 2024-03-11 RX ADMIN — SODIUM CHLORIDE, PRESERVATIVE FREE 10 ML: 5 INJECTION INTRAVENOUS at 08:19

## 2024-03-11 RX ADMIN — Medication 10.3 MILLICURIE: at 07:21

## 2024-03-11 RX ADMIN — REGADENOSON 0.4 MG: 0.08 INJECTION, SOLUTION INTRAVENOUS at 08:19

## 2024-03-11 RX ADMIN — Medication 35.6 MILLICURIE: at 08:19

## 2024-03-11 RX ADMIN — SODIUM CHLORIDE, PRESERVATIVE FREE 10 ML: 5 INJECTION INTRAVENOUS at 08:23

## 2024-03-12 LAB
ECHO BSA: 2.32 M2
NUC STRESS EJECTION FRACTION: 70 %
STRESS BASELINE DIAS BP: 70 MMHG
STRESS BASELINE HR: 60 BPM
STRESS BASELINE SYS BP: 134 MMHG
STRESS ESTIMATED WORKLOAD: 1.1 METS
STRESS PEAK DIAS BP: 72 MMHG
STRESS PEAK SYS BP: 134 MMHG
STRESS PERCENT HR ACHIEVED: 71 %
STRESS POST PEAK HR: 110 BPM
STRESS RATE PRESSURE PRODUCT: NORMAL BPM*MMHG
STRESS TARGET HR: 156 BPM

## 2024-03-12 PROCEDURE — 78452 HT MUSCLE IMAGE SPECT MULT: CPT | Performed by: INTERNAL MEDICINE

## 2024-03-12 PROCEDURE — 93016 CV STRESS TEST SUPVJ ONLY: CPT | Performed by: INTERNAL MEDICINE

## 2024-03-12 PROCEDURE — 93018 CV STRESS TEST I&R ONLY: CPT | Performed by: INTERNAL MEDICINE

## 2024-03-14 ENCOUNTER — TELEPHONE (OUTPATIENT)
Dept: NON INVASIVE DIAGNOSTICS | Age: 64
End: 2024-03-14

## 2024-03-14 NOTE — TELEPHONE ENCOUNTER
----- Message from NANO Kerr CNP sent at 3/13/2024  4:15 PM EDT -----  Please let Javy Ca know the stress pain was low risk so it us not likely that the increased shortness of breath and atypical chest pain is coming from the heart. Thanks.   ----- Message -----  From: Vic Roe MD  Sent: 3/12/2024   8:04 AM EDT  To: NANO Kerr CNP

## 2024-03-14 NOTE — TELEPHONE ENCOUNTER
Pt notified of results and verbalized understanding.    Electronically signed by Sandra Major MA on 3/14/2024 at 2:59 PM

## 2024-03-24 DIAGNOSIS — M48.02 SPINAL STENOSIS IN CERVICAL REGION: ICD-10-CM

## 2024-03-26 RX ORDER — TIZANIDINE 4 MG/1
4 TABLET ORAL EVERY 6 HOURS PRN
Qty: 90 TABLET | Refills: 1 | Status: SHIPPED | OUTPATIENT
Start: 2024-03-26

## 2024-05-01 ENCOUNTER — OFFICE VISIT (OUTPATIENT)
Age: 64
End: 2024-05-01
Payer: COMMERCIAL

## 2024-05-01 VITALS
SYSTOLIC BLOOD PRESSURE: 118 MMHG | OXYGEN SATURATION: 100 % | HEIGHT: 73 IN | WEIGHT: 231 LBS | BODY MASS INDEX: 30.62 KG/M2 | HEART RATE: 73 BPM | DIASTOLIC BLOOD PRESSURE: 64 MMHG | TEMPERATURE: 97.3 F | RESPIRATION RATE: 18 BRPM

## 2024-05-01 DIAGNOSIS — G89.4 CHRONIC PAIN SYNDROME: ICD-10-CM

## 2024-05-01 DIAGNOSIS — M48.02 SPINAL STENOSIS IN CERVICAL REGION: ICD-10-CM

## 2024-05-01 DIAGNOSIS — Z95.0 CARDIAC PACEMAKER IN SITU: ICD-10-CM

## 2024-05-01 DIAGNOSIS — I44.1 SECOND-DEGREE HEART BLOCK: Primary | ICD-10-CM

## 2024-05-01 DIAGNOSIS — M54.16 LUMBAR RADICULOPATHY: ICD-10-CM

## 2024-05-01 DIAGNOSIS — R06.2 WHEEZING: ICD-10-CM

## 2024-05-01 DIAGNOSIS — R06.02 SOB (SHORTNESS OF BREATH): ICD-10-CM

## 2024-05-01 DIAGNOSIS — E78.49 OTHER HYPERLIPIDEMIA: ICD-10-CM

## 2024-05-01 PROCEDURE — G8427 DOCREV CUR MEDS BY ELIG CLIN: HCPCS | Performed by: FAMILY MEDICINE

## 2024-05-01 PROCEDURE — 99214 OFFICE O/P EST MOD 30 MIN: CPT | Performed by: FAMILY MEDICINE

## 2024-05-01 PROCEDURE — 3017F COLORECTAL CA SCREEN DOC REV: CPT | Performed by: FAMILY MEDICINE

## 2024-05-01 PROCEDURE — G8417 CALC BMI ABV UP PARAM F/U: HCPCS | Performed by: FAMILY MEDICINE

## 2024-05-01 PROCEDURE — 1036F TOBACCO NON-USER: CPT | Performed by: FAMILY MEDICINE

## 2024-05-01 RX ORDER — GABAPENTIN 300 MG/1
300 CAPSULE ORAL 3 TIMES DAILY
Qty: 270 CAPSULE | Refills: 1 | Status: SHIPPED | OUTPATIENT
Start: 2024-05-01 | End: 2024-10-28

## 2024-05-01 ASSESSMENT — PATIENT HEALTH QUESTIONNAIRE - PHQ9
SUM OF ALL RESPONSES TO PHQ9 QUESTIONS 1 & 2: 6
4. FEELING TIRED OR HAVING LITTLE ENERGY: NEARLY EVERY DAY
10. IF YOU CHECKED OFF ANY PROBLEMS, HOW DIFFICULT HAVE THESE PROBLEMS MADE IT FOR YOU TO DO YOUR WORK, TAKE CARE OF THINGS AT HOME, OR GET ALONG WITH OTHER PEOPLE: VERY DIFFICULT
7. TROUBLE CONCENTRATING ON THINGS, SUCH AS READING THE NEWSPAPER OR WATCHING TELEVISION: SEVERAL DAYS
6. FEELING BAD ABOUT YOURSELF - OR THAT YOU ARE A FAILURE OR HAVE LET YOURSELF OR YOUR FAMILY DOWN: NOT AT ALL
5. POOR APPETITE OR OVEREATING: NOT AT ALL
3. TROUBLE FALLING OR STAYING ASLEEP: NEARLY EVERY DAY
SUM OF ALL RESPONSES TO PHQ QUESTIONS 1-9: 13
SUM OF ALL RESPONSES TO PHQ QUESTIONS 1-9: 13
9. THOUGHTS THAT YOU WOULD BE BETTER OFF DEAD, OR OF HURTING YOURSELF: NOT AT ALL
2. FEELING DOWN, DEPRESSED OR HOPELESS: NEARLY EVERY DAY
8. MOVING OR SPEAKING SO SLOWLY THAT OTHER PEOPLE COULD HAVE NOTICED. OR THE OPPOSITE, BEING SO FIGETY OR RESTLESS THAT YOU HAVE BEEN MOVING AROUND A LOT MORE THAN USUAL: NOT AT ALL
1. LITTLE INTEREST OR PLEASURE IN DOING THINGS: NEARLY EVERY DAY
SUM OF ALL RESPONSES TO PHQ QUESTIONS 1-9: 13
SUM OF ALL RESPONSES TO PHQ QUESTIONS 1-9: 13

## 2024-05-01 ASSESSMENT — ENCOUNTER SYMPTOMS
VOMITING: 0
ABDOMINAL PAIN: 0
NAUSEA: 0
SHORTNESS OF BREATH: 1
CONSTIPATION: 0
COUGH: 1
DIARRHEA: 0
WHEEZING: 1

## 2024-05-01 NOTE — PROGRESS NOTES
[facility]  DATE OF VISIT : 2024    Patient : Javy Ca   Age : 63 y.o.    : 1960   MRN : [id number]   ______________________________________________________________________    Chief Complaint :   Chief Complaint   Patient presents with    Follow-up Chronic Condition     C/o sharp pains throughout his body but Gabapentin does seem to be helping. Was not able to make it to Therapy due to being a bit skeptical. Doing exercises he was given for his neck and do help and give him relief he just needs to be more consistent.     Respiratory Distress     Difficulty breathing. Stress test was done and was normal. Thinks he still needs a little more oxygen and feels as if he cannot take a deep breath.        HPI : Javy Ca is 63 y.o. male who presented to the clinic today for OV.     Second-degree heart block status post pacemaker insertion on 2023: Continues to follow with EP.  Device check last done 11/10. Only on ASA. Had xray done to verify pacemaker placement  which was normal. Had stress test done 24 which was normal with an estimated EF 70%. Last seen by EP on 24.     Lower abdominal pain: feels relief with BM. Has not noticed any BRBPR but does note intermittent dark stools, including this morning. He had last C-Scope 2022 which was normal, Dr. Santillan recommended C-Scope in 3yrs.     Shortness of breath, LR and intermittent wheezing: Despite all his cardiac workup being normal patient continues to have intermittent shortness of breath as well as intermittent wheezing.  He has tried to remain more active which has improved but he still experiences shortness of breath and wheezing that is not associated with palpitations, chest pain.    I reviewed the patient's past medications, allergies and past medical history during this visit.    Past Medical History :    Past Medical History:   Diagnosis Date    Cervical fusion syndrome     Cervical

## 2024-05-06 DIAGNOSIS — M48.02 SPINAL STENOSIS IN CERVICAL REGION: ICD-10-CM

## 2024-05-07 PROCEDURE — 93296 REM INTERROG EVL PM/IDS: CPT | Performed by: INTERNAL MEDICINE

## 2024-05-07 PROCEDURE — 93294 REM INTERROG EVL PM/LDLS PM: CPT | Performed by: INTERNAL MEDICINE

## 2024-05-07 RX ORDER — TIZANIDINE 4 MG/1
4 TABLET ORAL EVERY 6 HOURS PRN
Qty: 90 TABLET | Refills: 1 | Status: SHIPPED | OUTPATIENT
Start: 2024-05-07

## 2024-05-13 ENCOUNTER — HOSPITAL ENCOUNTER (OUTPATIENT)
Dept: PULMONOLOGY | Age: 64
Discharge: HOME | End: 2024-05-13
Payer: COMMERCIAL

## 2024-05-13 DIAGNOSIS — R06.02 SOB (SHORTNESS OF BREATH): ICD-10-CM

## 2024-05-13 DIAGNOSIS — R06.2 WHEEZING: ICD-10-CM

## 2024-05-13 PROCEDURE — 94726 PLETHYSMOGRAPHY LUNG VOLUMES: CPT

## 2024-05-13 PROCEDURE — 94060 EVALUATION OF WHEEZING: CPT

## 2024-05-13 PROCEDURE — 94729 DIFFUSING CAPACITY: CPT

## 2024-05-14 ENCOUNTER — TELEPHONE (OUTPATIENT)
Age: 64
End: 2024-05-14

## 2024-05-14 PROCEDURE — 94729 DIFFUSING CAPACITY: CPT | Performed by: INTERNAL MEDICINE

## 2024-05-14 PROCEDURE — 94060 EVALUATION OF WHEEZING: CPT | Performed by: INTERNAL MEDICINE

## 2024-05-14 PROCEDURE — 94726 PLETHYSMOGRAPHY LUNG VOLUMES: CPT | Performed by: INTERNAL MEDICINE

## 2024-05-14 NOTE — PROCEDURES
[facility]              [address] [place] [address]                           PULMONARY FUNCTION      PATIENT NAME: DEANDRA MACIAS             : 1960  MED REC NO: [id number]                        ROOM:   ACCOUNT NO: [id number]                       ADMIT DATE: 2024  PROVIDER: Portillo Casey MD      DATE OF PROCEDURE:  2024    The spirometry shows normal FVC, FEV1, and FEV1/FVC.  The maximum voluntary ventilation is 61% of the predicted value.    According to Z-score criteria, FVC, FEV1, and FEV1/FVC are under the area of curve, within standard deviation of -1.64.  After bronchodilator therapy, there is no significant improvement seen in the spirometry.    The lung volume study shows normal TLC but increased RV.    The diffusion capacity is normal.    IMPRESSION:  The above study is normal with some air trapping, and there is no significant change after bronchodilator therapy.          PORTILLO CASEY MD      D:  2024 13:37:55     T:  2024 07:01:04     BRITNEY/LAKISHA  Job #:  [id number]     Doc#:  [id number]

## 2024-06-24 DIAGNOSIS — M48.02 SPINAL STENOSIS IN CERVICAL REGION: ICD-10-CM

## 2024-06-25 RX ORDER — TIZANIDINE 4 MG/1
4 TABLET ORAL EVERY 6 HOURS PRN
Qty: 90 TABLET | Refills: 1 | Status: SHIPPED | OUTPATIENT
Start: 2024-06-25

## 2024-08-07 DIAGNOSIS — M48.02 SPINAL STENOSIS IN CERVICAL REGION: ICD-10-CM

## 2024-08-19 PROCEDURE — 93296 REM INTERROG EVL PM/IDS: CPT | Performed by: INTERNAL MEDICINE

## 2024-08-19 PROCEDURE — 93294 REM INTERROG EVL PM/LDLS PM: CPT | Performed by: INTERNAL MEDICINE

## 2024-09-10 DIAGNOSIS — M48.02 SPINAL STENOSIS IN CERVICAL REGION: ICD-10-CM

## 2024-10-22 DIAGNOSIS — M48.02 SPINAL STENOSIS IN CERVICAL REGION: ICD-10-CM

## 2024-10-22 NOTE — TELEPHONE ENCOUNTER
Name of Medication(s) Requested:  Requested Prescriptions     Pending Prescriptions Disp Refills    tiZANidine (ZANAFLEX) 4 MG tablet [Pharmacy Med Name: TIZANIDINE HCL 4 MG TABLET] 90 tablet 1     Sig: TAKE 1 TABLET BY MOUTH EVERY 6 HOURS AS NEEDED FOR PAIN       Medication is on current medication list Yes    Dosage and directions were verified? Yes    Quantity verified: 90 day supply     Pharmacy Verified?  Yes    Last Appointment:  5/1/2024    Future appts:  Future Appointments   Date Time Provider Department Center   11/5/2024 10:45 AM Jacquie Daniel MD [place] [facility]   4/10/2025 10:00 AM Joseph Calabrese MD [facility]        (If no appt send self scheduling link. .REFILLAPPT)  Scheduling request sent?     [] Yes  [x] No    Does patient need updated?  [] Yes  [x] No

## 2024-11-04 ENCOUNTER — TELEPHONE (OUTPATIENT)
Age: 64
End: 2024-11-04

## 2024-11-04 NOTE — TELEPHONE ENCOUNTER
I called patient to confirm appt fot tomorrow but he don't answer the phone I left voicemail with appt time.

## 2024-11-05 ENCOUNTER — OFFICE VISIT (OUTPATIENT)
Age: 64
End: 2024-11-05

## 2024-11-05 VITALS
RESPIRATION RATE: 18 BRPM | TEMPERATURE: 97.9 F | OXYGEN SATURATION: 95 % | BODY MASS INDEX: 30.79 KG/M2 | SYSTOLIC BLOOD PRESSURE: 146 MMHG | HEIGHT: 73 IN | WEIGHT: 232.3 LBS | HEART RATE: 80 BPM | DIASTOLIC BLOOD PRESSURE: 65 MMHG

## 2024-11-05 DIAGNOSIS — Z95.0 CARDIAC PACEMAKER IN SITU: ICD-10-CM

## 2024-11-05 DIAGNOSIS — E78.2 MIXED HYPERLIPIDEMIA: ICD-10-CM

## 2024-11-05 DIAGNOSIS — M48.02 SPINAL STENOSIS IN CERVICAL REGION: ICD-10-CM

## 2024-11-05 DIAGNOSIS — E03.9 HYPOTHYROIDISM, UNSPECIFIED TYPE: ICD-10-CM

## 2024-11-05 DIAGNOSIS — K21.9 GASTROESOPHAGEAL REFLUX DISEASE, UNSPECIFIED WHETHER ESOPHAGITIS PRESENT: ICD-10-CM

## 2024-11-05 DIAGNOSIS — E55.9 VITAMIN D DEFICIENCY: ICD-10-CM

## 2024-11-05 DIAGNOSIS — Z23 NEED FOR VACCINATION: Primary | ICD-10-CM

## 2024-11-05 PROBLEM — R35.0 BENIGN PROSTATIC HYPERPLASIA WITH URINARY FREQUENCY: Status: ACTIVE | Noted: 2024-09-30

## 2024-11-05 PROBLEM — N40.1 BENIGN PROSTATIC HYPERPLASIA WITH URINARY FREQUENCY: Status: ACTIVE | Noted: 2024-09-30

## 2024-11-05 LAB
ALBUMIN: 4.3 G/DL (ref 3.5–5.2)
ALP BLD-CCNC: 100 U/L (ref 40–129)
ALT SERPL-CCNC: 20 U/L (ref 0–40)
ANION GAP SERPL CALCULATED.3IONS-SCNC: 11 MMOL/L (ref 7–16)
AST SERPL-CCNC: 40 U/L (ref 0–39)
BASOPHILS ABSOLUTE: 0.03 K/UL (ref 0–0.2)
BASOPHILS RELATIVE PERCENT: 1 % (ref 0–2)
BILIRUB SERPL-MCNC: 0.7 MG/DL (ref 0–1.2)
BUN BLDV-MCNC: 8 MG/DL (ref 6–23)
CALCIUM SERPL-MCNC: 9.5 MG/DL (ref 8.6–10.2)
CHLORIDE BLD-SCNC: 104 MMOL/L (ref 98–107)
CHOLESTEROL, TOTAL: 156 MG/DL
CO2: 25 MMOL/L (ref 22–29)
CREAT SERPL-MCNC: 1 MG/DL (ref 0.7–1.2)
EOSINOPHILS ABSOLUTE: 0.06 K/UL (ref 0.05–0.5)
EOSINOPHILS RELATIVE PERCENT: 2 % (ref 0–6)
GFR, ESTIMATED: 83 ML/MIN/1.73M2
GLUCOSE BLD-MCNC: 90 MG/DL (ref 74–99)
HCT VFR BLD CALC: 43.4 % (ref 37–54)
HDLC SERPL-MCNC: 45 MG/DL
HEMOGLOBIN: 13.6 G/DL (ref 12.5–16.5)
IMMATURE GRANULOCYTES %: 0 % (ref 0–5)
IMMATURE GRANULOCYTES ABSOLUTE: <0.03 K/UL (ref 0–0.58)
LDL CHOLESTEROL: 100 MG/DL
LYMPHOCYTES ABSOLUTE: 1.93 K/UL (ref 1.5–4)
LYMPHOCYTES RELATIVE PERCENT: 51 % (ref 20–42)
MCH RBC QN AUTO: 30.6 PG (ref 26–35)
MCHC RBC AUTO-ENTMCNC: 31.3 G/DL (ref 32–34.5)
MCV RBC AUTO: 97.5 FL (ref 80–99.9)
MONOCYTES ABSOLUTE: 0.45 K/UL (ref 0.1–0.95)
MONOCYTES RELATIVE PERCENT: 12 % (ref 2–12)
NEUTROPHILS ABSOLUTE: 1.31 K/UL (ref 1.8–7.3)
NEUTROPHILS RELATIVE PERCENT: 35 % (ref 43–80)
PDW BLD-RTO: 12.4 % (ref 11.5–15)
PLATELET # BLD: 155 K/UL (ref 130–450)
PMV BLD AUTO: 11.7 FL (ref 7–12)
POTASSIUM SERPL-SCNC: 4.9 MMOL/L (ref 3.5–5)
RBC # BLD: 4.45 M/UL (ref 3.8–5.8)
SODIUM BLD-SCNC: 140 MMOL/L (ref 132–146)
T4 FREE: 1 NG/DL (ref 0.9–1.7)
TOTAL PROTEIN: 6.8 G/DL (ref 6.4–8.3)
TRIGL SERPL-MCNC: 57 MG/DL
TSH SERPL DL<=0.05 MIU/L-ACNC: 2.87 UIU/ML (ref 0.27–4.2)
VITAMIN D 25-HYDROXY: 31.1 NG/ML (ref 30–100)
VLDLC SERPL CALC-MCNC: 11 MG/DL
WBC # BLD: 3.8 K/UL (ref 4.5–11.5)

## 2024-11-05 RX ORDER — PANTOPRAZOLE SODIUM 40 MG/1
40 TABLET, DELAYED RELEASE ORAL
Qty: 90 TABLET | Refills: 1 | Status: SHIPPED | OUTPATIENT
Start: 2024-11-05

## 2024-11-05 RX ORDER — ATORVASTATIN CALCIUM 20 MG/1
20 TABLET, FILM COATED ORAL DAILY
Qty: 90 TABLET | Refills: 1 | Status: SHIPPED | OUTPATIENT
Start: 2024-11-05

## 2024-11-05 RX ORDER — GABAPENTIN 300 MG/1
300 CAPSULE ORAL 3 TIMES DAILY
Qty: 270 CAPSULE | Refills: 1 | Status: SHIPPED | OUTPATIENT
Start: 2024-11-05 | End: 2025-05-04

## 2024-11-05 ASSESSMENT — ENCOUNTER SYMPTOMS
NAUSEA: 0
WHEEZING: 0
COUGH: 0
ABDOMINAL PAIN: 0
VOMITING: 0
DIARRHEA: 0
CONSTIPATION: 0
SHORTNESS OF BREATH: 0

## 2024-11-22 ENCOUNTER — OFFICE VISIT (OUTPATIENT)
Age: 64
End: 2024-11-22
Payer: COMMERCIAL

## 2024-11-22 VITALS
TEMPERATURE: 98.1 F | OXYGEN SATURATION: 99 % | HEART RATE: 72 BPM | HEIGHT: 73 IN | DIASTOLIC BLOOD PRESSURE: 68 MMHG | WEIGHT: 233.6 LBS | SYSTOLIC BLOOD PRESSURE: 128 MMHG | RESPIRATION RATE: 16 BRPM | BODY MASS INDEX: 30.96 KG/M2

## 2024-11-22 DIAGNOSIS — R25.1 TREMOR: ICD-10-CM

## 2024-11-22 DIAGNOSIS — R07.9 CHEST PAIN, UNSPECIFIED TYPE: ICD-10-CM

## 2024-11-22 DIAGNOSIS — G20.C PARKINSONISM, UNSPECIFIED PARKINSONISM TYPE (HCC): Primary | ICD-10-CM

## 2024-11-22 PROCEDURE — 1036F TOBACCO NON-USER: CPT | Performed by: FAMILY MEDICINE

## 2024-11-22 PROCEDURE — 93000 ELECTROCARDIOGRAM COMPLETE: CPT | Performed by: FAMILY MEDICINE

## 2024-11-22 PROCEDURE — 99214 OFFICE O/P EST MOD 30 MIN: CPT | Performed by: FAMILY MEDICINE

## 2024-11-22 PROCEDURE — 3017F COLORECTAL CA SCREEN DOC REV: CPT | Performed by: FAMILY MEDICINE

## 2024-11-22 PROCEDURE — G8484 FLU IMMUNIZE NO ADMIN: HCPCS | Performed by: FAMILY MEDICINE

## 2024-11-22 PROCEDURE — G8427 DOCREV CUR MEDS BY ELIG CLIN: HCPCS | Performed by: FAMILY MEDICINE

## 2024-11-22 PROCEDURE — G8417 CALC BMI ABV UP PARAM F/U: HCPCS | Performed by: FAMILY MEDICINE

## 2024-11-22 ASSESSMENT — ENCOUNTER SYMPTOMS
DIARRHEA: 0
COUGH: 0
NAUSEA: 0
ABDOMINAL PAIN: 0
SHORTNESS OF BREATH: 0
VOMITING: 0
CONSTIPATION: 0
WHEEZING: 0
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
## 2024-12-08 DIAGNOSIS — M48.02 SPINAL STENOSIS IN CERVICAL REGION: ICD-10-CM

## 2025-02-04 ENCOUNTER — HOSPITAL ENCOUNTER (OUTPATIENT)
Age: 65
Discharge: HOME | End: 2025-02-04
Payer: MEDICARE

## 2025-02-04 DIAGNOSIS — R41.3 POOR SHORT TERM MEMORY: ICD-10-CM

## 2025-02-04 DIAGNOSIS — R51.9 NEW ONSET OF HEADACHES: ICD-10-CM

## 2025-02-04 LAB
ANION GAP SERPL CALCULATED.3IONS-SCNC: 10 MMOL/L (ref 7–16)
BUN SERPL-MCNC: 11 MG/DL (ref 6–23)
CALCIUM SERPL-MCNC: 9.3 MG/DL (ref 8.6–10.2)
CHLORIDE SERPL-SCNC: 102 MMOL/L (ref 98–107)
CO2 SERPL-SCNC: 27 MMOL/L (ref 22–29)
CREAT SERPL-MCNC: 1 MG/DL (ref 0.7–1.2)
ERYTHROCYTE [SEDIMENTATION RATE] IN BLOOD BY WESTERGREN METHOD: 1 MM/HR (ref 0–15)
FOLATE SERPL-MCNC: 12.2 NG/ML (ref 4.8–24.2)
GFR, ESTIMATED: 86 ML/MIN/1.73M2
GLUCOSE SERPL-MCNC: 103 MG/DL (ref 74–99)
POTASSIUM SERPL-SCNC: 4.5 MMOL/L (ref 3.5–5)
SODIUM SERPL-SCNC: 139 MMOL/L (ref 132–146)
VIT B12 SERPL-MCNC: 717 PG/ML (ref 211–946)

## 2025-02-04 PROCEDURE — 80048 BASIC METABOLIC PNL TOTAL CA: CPT

## 2025-02-04 PROCEDURE — 85652 RBC SED RATE AUTOMATED: CPT

## 2025-02-04 PROCEDURE — 82746 ASSAY OF FOLIC ACID SERUM: CPT

## 2025-02-04 PROCEDURE — 36415 COLL VENOUS BLD VENIPUNCTURE: CPT

## 2025-02-04 PROCEDURE — 82607 VITAMIN B-12: CPT

## 2025-02-12 ENCOUNTER — HOSPITAL ENCOUNTER (OUTPATIENT)
Dept: MRI IMAGING | Age: 65
Discharge: HOME | End: 2025-02-14
Payer: MEDICARE

## 2025-02-12 DIAGNOSIS — R51.9 NEW ONSET OF HEADACHES: ICD-10-CM

## 2025-02-12 DIAGNOSIS — R41.3 POOR SHORT TERM MEMORY: ICD-10-CM

## 2025-02-12 PROCEDURE — 6360000004 HC RX CONTRAST MEDICATION: Performed by: RADIOLOGY

## 2025-02-12 PROCEDURE — A9579 GAD-BASE MR CONTRAST NOS,1ML: HCPCS | Performed by: RADIOLOGY

## 2025-02-12 PROCEDURE — 70553 MRI BRAIN STEM W/O & W/DYE: CPT

## 2025-02-12 RX ADMIN — GADOTERIDOL 20 ML: 279.3 INJECTION, SOLUTION INTRAVENOUS at 12:40

## 2025-02-27 ENCOUNTER — TELEPHONE (OUTPATIENT)
Dept: NON INVASIVE DIAGNOSTICS | Age: 65
End: 2025-02-27

## 2025-02-27 NOTE — TELEPHONE ENCOUNTER
Patient called in and stated that for the last few night he has had a little pain/burning around device site.  Denies any other symptoms.  Patient sending remote.

## 2025-02-28 NOTE — TELEPHONE ENCOUNTER
Left message for patient to call the office.     Plan:   Schedule device clinic     Kim FOOTE,RN   [facility] [place]   Device Clinic

## 2025-03-03 NOTE — TELEPHONE ENCOUNTER
Patient called back.   Patient denies any fevers or infection at device site. He just states that it is painful.   Scheduled device clinic appointment for Thursday.   Patient states he is starting to feel under the weather this morning therefore scheduled device clinic appointment for Thursday. If patient still has flu like symptoms on Thursday he will call and reschedule his wound check.       Kim SALMONN,RN   [facility] [place]   Device Clinic

## 2025-03-05 ENCOUNTER — TELEPHONE (OUTPATIENT)
Dept: NON INVASIVE DIAGNOSTICS | Age: 65
End: 2025-03-05

## 2025-03-05 NOTE — TELEPHONE ENCOUNTER
Patient called into the device clinic and stated that he was not feeling well and he need to reschedule his appointment for tomorrow 3/6/2025. He stated he will call to reschedule when he feels better.      Becky Arce, CMA

## 2025-03-11 ENCOUNTER — NURSE ONLY (OUTPATIENT)
Dept: NON INVASIVE DIAGNOSTICS | Age: 65
End: 2025-03-11

## 2025-03-11 DIAGNOSIS — Z95.0 PACEMAKER: Primary | ICD-10-CM

## 2025-03-11 NOTE — PROGRESS NOTES
See murj report   Patient presents to device clinic for wound check. Patient complains of a burning sensation above the device site closer to his clavicle. Patient declines chest pain, chest pressure, jaw pain . Device function WNL. Mode: MVP . Real time AS/VS @ 67.    No signs or symptoms of infection. No signs of erosion. Device site WNL. See Epic for device site photo.         Reviewed with Lolly Jimenez NP. Patient has history of Neck surgery and C3-5 spinal stenosis. Patient instructed to follow up with PCP regarding symptoms. Patient voiced understanding    Kim FOOTE,RN   [facility] [place]   Device Clinic

## 2025-03-19 DIAGNOSIS — E78.2 MIXED HYPERLIPIDEMIA: ICD-10-CM

## 2025-03-19 RX ORDER — ATORVASTATIN CALCIUM 20 MG/1
20 TABLET, FILM COATED ORAL DAILY
Qty: 90 TABLET | Refills: 1 | Status: SHIPPED | OUTPATIENT
Start: 2025-03-19

## 2025-03-19 NOTE — TELEPHONE ENCOUNTER
Name of Medication(s) Requested:  Requested Prescriptions     Pending Prescriptions Disp Refills    atorvastatin (LIPITOR) 20 MG tablet [Pharmacy Med Name: ATORVASTATIN 20 MG TABLET] 90 tablet 1     Sig: TAKE 1 TABLET BY MOUTH EVERY DAY       Medication is on current medication list Yes    Dosage and directions were verified? Yes    Quantity verified: 90 day supply     Pharmacy Verified?  Yes    Last Appointment:  11/22/2024    Future appts:  Future Appointments   Date Time Provider Department Center   4/10/2025 10:00 AM Joseph Calabrese MD [facility]   4/24/2025  3:30 PM Jerel Agosto DO [place] [facility] -   5/6/2025 10:45 AM [facility]   6/17/2025 12:45 PM SCHEDULE, DEVICE CLINIC 1 [facility]   6/17/2025 12:45 PM Jamarcus Rosales MD [facility]        (If no appt send self scheduling link. .REFILLAPPT)  Scheduling request sent?     [] Yes  [x] No    Does patient need updated?  [] Yes  [x] No

## 2025-03-31 DIAGNOSIS — K21.9 GASTROESOPHAGEAL REFLUX DISEASE, UNSPECIFIED WHETHER ESOPHAGITIS PRESENT: ICD-10-CM

## 2025-03-31 NOTE — TELEPHONE ENCOUNTER
Name of Medication(s) Requested:  Requested Prescriptions     Pending Prescriptions Disp Refills    pantoprazole (PROTONIX) 40 MG tablet [Pharmacy Med Name: PANTOPRAZOLE SOD DR 40 MG TAB] 90 tablet 1     Sig: TAKE 1 TABLET BY MOUTH EVERY DAY BEFORE BREAKFAST       Medication is on current medication list Yes    Dosage and directions were verified? Yes    Quantity verified: 90 day supply     Pharmacy Verified?  Yes    Last Appointment:  11/22/2024    Future appts:  Future Appointments   Date Time Provider Department Center   4/10/2025 10:00 AM Joseph Calabrese MD VASC/MED [facility]   4/24/2025  3:30 PM Jerel Agosto DO [place] [facility] Neu[facility] -   5/6/2025 10:45 AM Jacquie Daniel MD [facility]   6/17/2025 12:45 PM SCHEDULE, DEVICE CLINIC 1 [facility]   6/17/2025 12:45 PM Jamarcus Rosales MD [facility]        (If no appt send self scheduling link. .REFILLAPPT)  Scheduling request sent?     [] Yes  [] No    Does patient need updated?  [] Yes  [] No

## 2025-04-01 RX ORDER — PANTOPRAZOLE SODIUM 40 MG/1
40 TABLET, DELAYED RELEASE ORAL
Qty: 90 TABLET | Refills: 1 | Status: SHIPPED | OUTPATIENT
Start: 2025-04-01

## 2025-04-10 ENCOUNTER — OFFICE VISIT (OUTPATIENT)
Dept: VASCULAR SURGERY | Age: 65
End: 2025-04-10

## 2025-04-10 DIAGNOSIS — R09.89 PROMINENT ABDOMINAL AORTIC PULSE: Primary | ICD-10-CM

## 2025-04-10 NOTE — PROGRESS NOTES
Chief Complaint:   Chief Complaint   Patient presents with    Follow-up     ROSARIO.          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 cm based on the testing in the past, was recommended to have ultrasound done every 3 years,      Patient states that he does get vague abdominal discomfort all of the abdomen on and off for the last several weeks, was instructed to make sure that he does notify his PCP and come to the emergency room if the situation gets worse      Patient denies any focal lateralizing neurological symptoms like loss of speech, vision or loss of function of extremity    Patient can walk a few blocks , and denies any symptoms of rest pain    No Known Allergies    Current Outpatient Medications   Medication Sig Dispense Refill    pantoprazole (PROTONIX) 40 MG tablet TAKE 1 TABLET BY MOUTH EVERY DAY BEFORE BREAKFAST 90 tablet 1    primidone (MYSOLINE) 50 MG tablet Take 3 tablets by mouth nightly 90 tablet 3    atorvastatin (LIPITOR) 20 MG tablet TAKE 1 TABLET BY MOUTH EVERY DAY 90 tablet 1    tiZANidine (ZANAFLEX) 4 MG tablet TAKE 1 TABLET BY MOUTH EVERY 6 HOURS AS NEEDED FOR PAIN 90 tablet 1    LYCOPENE PO Take 1 tablet by mouth daily      gabapentin (NEURONTIN) 300 MG capsule Take 1 capsule by mouth 3 times daily for 180 days. 270 capsule 1    tamsulosin (FLOMAX) 0.4 MG capsule Take 1 capsule by mouth daily      aspirin 81 MG EC tablet Take 1 tablet by mouth daily      Multiple Vitamins-Minerals (ONE-A-DAY MENS 50+ ADVANTAGE) TABS Take 1 tablet by mouth daily       No current facility-administered medications for this visit.       Past Medical History:   Diagnosis Date    Cervical fusion syndrome     Cervical radiculopathy     Cervical spinal stenosis     severe    DDD (degenerative disc disease)     Enlarged prostate     External carotid artery stenosis 01/25/2018    Headache     History of colonic polyps     cscope 3/2022 showed

## 2025-05-05 ENCOUNTER — HOSPITAL ENCOUNTER (OUTPATIENT)
Dept: PHYSICAL THERAPY | Age: 65
Setting detail: THERAPIES SERIES
Discharge: HOME | End: 2025-05-05

## 2025-05-05 ENCOUNTER — TELEPHONE (OUTPATIENT)
Age: 65
End: 2025-05-05

## 2025-05-05 NOTE — PROGRESS NOTES
[facility]                Phone: [phone]  Fax: [phone]    Physical Therapy  Cancellation/No-show Note  Patient Name:  Javy Ca  :  1960   Date:  2025    For today's appointment patient:  []  Cancelled  []  Rescheduled appointment  [x]  No-show     Reason given by patient:  []  Patient ill  []  Conflicting appointment  []  No transportation    []  Conflict with work  [x]  No reason given  []  Other:     Comments:  pt was a  no-show for initial evaluation.      Electronically signed by:  Jose Nance PT

## 2025-05-06 ENCOUNTER — OFFICE VISIT (OUTPATIENT)
Age: 65
End: 2025-05-06

## 2025-05-06 VITALS
HEART RATE: 88 BPM | OXYGEN SATURATION: 98 % | WEIGHT: 238.6 LBS | BODY MASS INDEX: 31.62 KG/M2 | HEIGHT: 73 IN | DIASTOLIC BLOOD PRESSURE: 62 MMHG | TEMPERATURE: 97.5 F | RESPIRATION RATE: 18 BRPM | SYSTOLIC BLOOD PRESSURE: 102 MMHG

## 2025-05-06 DIAGNOSIS — M48.02 SPINAL STENOSIS IN CERVICAL REGION: ICD-10-CM

## 2025-05-06 DIAGNOSIS — R09.89 PROMINENT ABDOMINAL AORTIC PULSE: ICD-10-CM

## 2025-05-06 DIAGNOSIS — R35.0 BENIGN PROSTATIC HYPERPLASIA WITH URINARY FREQUENCY: ICD-10-CM

## 2025-05-06 DIAGNOSIS — I44.1 SECOND-DEGREE HEART BLOCK: ICD-10-CM

## 2025-05-06 DIAGNOSIS — M54.2 CERVICALGIA: ICD-10-CM

## 2025-05-06 DIAGNOSIS — R10.11 RUQ ABDOMINAL PAIN: Primary | ICD-10-CM

## 2025-05-06 DIAGNOSIS — N40.1 BENIGN PROSTATIC HYPERPLASIA WITH URINARY FREQUENCY: ICD-10-CM

## 2025-05-06 SDOH — ECONOMIC STABILITY: FOOD INSECURITY: WITHIN THE PAST 12 MONTHS, THE FOOD YOU BOUGHT JUST DIDN'T LAST AND YOU DIDN'T HAVE MONEY TO GET MORE.: NEVER TRUE

## 2025-05-06 SDOH — ECONOMIC STABILITY: FOOD INSECURITY: WITHIN THE PAST 12 MONTHS, YOU WORRIED THAT YOUR FOOD WOULD RUN OUT BEFORE YOU GOT MONEY TO BUY MORE.: NEVER TRUE

## 2025-05-06 ASSESSMENT — ENCOUNTER SYMPTOMS
COUGH: 0
CONSTIPATION: 0
SHORTNESS OF BREATH: 0
ABDOMINAL PAIN: 0
VOMITING: 0
WHEEZING: 0
NAUSEA: 0
DIARRHEA: 0

## 2025-05-06 ASSESSMENT — PATIENT HEALTH QUESTIONNAIRE - PHQ9
3. TROUBLE FALLING OR STAYING ASLEEP: NOT AT ALL
SUM OF ALL RESPONSES TO PHQ QUESTIONS 1-9: 0
10. IF YOU CHECKED OFF ANY PROBLEMS, HOW DIFFICULT HAVE THESE PROBLEMS MADE IT FOR YOU TO DO YOUR WORK, TAKE CARE OF THINGS AT HOME, OR GET ALONG WITH OTHER PEOPLE: NOT DIFFICULT AT ALL
8. MOVING OR SPEAKING SO SLOWLY THAT OTHER PEOPLE COULD HAVE NOTICED. OR THE OPPOSITE, BEING SO FIGETY OR RESTLESS THAT YOU HAVE BEEN MOVING AROUND A LOT MORE THAN USUAL: NOT AT ALL
9. THOUGHTS THAT YOU WOULD BE BETTER OFF DEAD, OR OF HURTING YOURSELF: NOT AT ALL
1. LITTLE INTEREST OR PLEASURE IN DOING THINGS: NOT AT ALL
5. POOR APPETITE OR OVEREATING: NOT AT ALL
SUM OF ALL RESPONSES TO PHQ QUESTIONS 1-9: 0
6. FEELING BAD ABOUT YOURSELF - OR THAT YOU ARE A FAILURE OR HAVE LET YOURSELF OR YOUR FAMILY DOWN: NOT AT ALL
SUM OF ALL RESPONSES TO PHQ QUESTIONS 1-9: 0
SUM OF ALL RESPONSES TO PHQ QUESTIONS 1-9: 0
7. TROUBLE CONCENTRATING ON THINGS, SUCH AS READING THE NEWSPAPER OR WATCHING TELEVISION: NOT AT ALL
4. FEELING TIRED OR HAVING LITTLE ENERGY: NOT AT ALL
2. FEELING DOWN, DEPRESSED OR HOPELESS: NOT AT ALL

## 2025-05-06 NOTE — PROGRESS NOTES
[facility]  DATE OF VISIT : 2025    Patient : Javy Ca   Age : 64 y.o.    : 1960   MRN : [id number]   ______________________________________________________________________    Chief Complaint :   Chief Complaint   Patient presents with    6 Month Follow-Up     Patient is here today for a 6 month follow up today. Needs to make an appt with Urology. Currently taking Flomax. Medication has helped with his symptoms and is almost running out of medication.     GI Problem     C/o Stomach pain. Bloating present after he eats and even when he does not eat. States he almost went to the ER for abdominal pain. Had a colonoscopy less than 5 years ago and when he went to try to have another one done recently he was told he could not have it done due to it being too early.        HPI : Javy Ca is 64 y.o. male who presented to the clinic today for OV.     Second-degree heart block status post pacemaker insertion on 2023: Continues to follow with EP.  Device check last done 11/10. Only on ASA. Had xray done to verify pacemaker placement  which was normal. Had stress test done 24 which was normal with an estimated EF 70%. Last seen by EP on 24.     Tremors/parkinsonism: now following with Neurology. Started on Primidone 150mg daily, doing better since starting. Last eval 25, plan for 6mos FU. Plan for PT for cervicalgia. Had appt yesterday but missed it.     BPH: On flomax 0.4mg daily.     C-Scope: done 3/4/22- report states repeat in 3yrs. He was seen by Dr. Pleitez, he reviewed reports and recommended 5yrs instead of 3yrs.     RUQ abdominal pain: ongoing for years, comes and goes. Worse after meals, sometimes feels full and TTP. Sometimes it causes him to feel bloated.     I reviewed the patient's past medications, allergies and past medical history during this visit.    Past Medical History :  Past Medical History:   Diagnosis Date    Cervical fusion syndrome

## 2025-05-22 ENCOUNTER — APPOINTMENT (OUTPATIENT)
Dept: CT IMAGING | Age: 65
End: 2025-05-22
Payer: MEDICARE

## 2025-05-22 ENCOUNTER — HOSPITAL ENCOUNTER (EMERGENCY)
Age: 65
Discharge: HOME | End: 2025-05-22
Payer: MEDICARE

## 2025-05-22 VITALS
HEART RATE: 66 BPM | SYSTOLIC BLOOD PRESSURE: 133 MMHG | WEIGHT: 238 LBS | TEMPERATURE: 97.9 F | OXYGEN SATURATION: 98 % | RESPIRATION RATE: 16 BRPM | DIASTOLIC BLOOD PRESSURE: 67 MMHG | BODY MASS INDEX: 31.4 KG/M2

## 2025-05-22 DIAGNOSIS — S16.1XXA NECK STRAIN, INITIAL ENCOUNTER: ICD-10-CM

## 2025-05-22 DIAGNOSIS — S09.90XA CLOSED HEAD INJURY, INITIAL ENCOUNTER: Primary | ICD-10-CM

## 2025-05-22 DIAGNOSIS — T07.XXXA MULTIPLE CONTUSIONS: ICD-10-CM

## 2025-05-22 PROCEDURE — 70450 CT HEAD/BRAIN W/O DYE: CPT

## 2025-05-22 PROCEDURE — 99284 EMERGENCY DEPT VISIT MOD MDM: CPT

## 2025-05-22 PROCEDURE — 72125 CT NECK SPINE W/O DYE: CPT

## 2025-05-22 PROCEDURE — 6370000000 HC RX 637 (ALT 250 FOR IP): Performed by: PHYSICIAN ASSISTANT

## 2025-05-22 RX ORDER — ONDANSETRON 4 MG/1
4 TABLET, ORALLY DISINTEGRATING ORAL ONCE
Status: COMPLETED | OUTPATIENT
Start: 2025-05-22 | End: 2025-05-22

## 2025-05-22 RX ORDER — ACETAMINOPHEN 500 MG
1000 TABLET ORAL ONCE
Status: COMPLETED | OUTPATIENT
Start: 2025-05-22 | End: 2025-05-22

## 2025-05-22 RX ORDER — CYCLOBENZAPRINE HCL 10 MG
5 TABLET ORAL ONCE
Status: COMPLETED | OUTPATIENT
Start: 2025-05-22 | End: 2025-05-22

## 2025-05-22 RX ADMIN — CYCLOBENZAPRINE 5 MG: 10 TABLET, FILM COATED ORAL at 09:23

## 2025-05-22 RX ADMIN — ACETAMINOPHEN 1000 MG: 500 TABLET ORAL at 09:23

## 2025-05-22 RX ADMIN — ONDANSETRON 4 MG: 4 TABLET, ORALLY DISINTEGRATING ORAL at 09:22

## 2025-05-22 ASSESSMENT — PAIN DESCRIPTION - LOCATION: LOCATION: NECK;HEAD

## 2025-05-22 ASSESSMENT — PAIN SCALES - GENERAL: PAINLEVEL_OUTOF10: 5

## 2025-05-22 NOTE — DISCHARGE INSTRUCTIONS
CT HEAD WO CONTRAST   Final Result   No acute intracranial abnormality.         CT CERVICAL SPINE WO CONTRAST   Final Result   1. No acute fracture or traumatic malalignment of the cervical spine.   2. Fusion identified from C3 through C7. Degenerative changes involving the   C7/T1 level with anterior spurring and posterior osteophyte formation seen   throughout the upper thoracic vertebral body levels.            Tylenol alternating with ibuprofen every 6-8 hours with food as tolerated lidocaine patches.  No broken bones noted.  Please follow with your family doctor.

## 2025-05-22 NOTE — ED PROVIDER NOTES
Independent NIK Visit.         [facility] EMERGENCY DEPARTMENT  ED  Encounter Note  Admit Date/RoomTime: 2025 11:13 AM  ED Room: PR1/PR1  NAME: Javy Ca  : 1960  MRN: [id number]  PCP: Jacquie Daniel MD    CHIEF COMPLAINT     Fall (Pt was sitting on the side of the tub yesterday and fell, hitting the back of his head. +thinners, ?LOC, +nausea. )    HISTORY OF PRESENT ILLNESS        Javy Ca is a 64 y.o. male who presents to the ED by private vehicle for fall that occurred yesterday.  Patient states he was sitting on the edge of the tub cleaning the tub for his wife.  Patient states he slipped and fell hitting the side of his head and the back of his head.  Patient states today he has right-sided neck pain and a headache.  Patient states he did not have a loss of consciousness.  Patient does not take actual blood thinners only takes 81 mg of aspirin.  Patient states has had intermittent nausea without vomiting.  Patient has no blurry vision, chest pain, shortness of breath, fever, chills, nausea, vomiting, severe abdominal pain.  Patient has not taken anything to make this better.  Patient did have a friend dropped him off today.  Patient is fully ambulatory.  Patient has no loss of bowel or bladder function, general numbness or tingling or weakness.  Patient does have a history of cervical fusion, cervical radiculopathy, cervical spinal stenosis and degenerative changes. The complaint has been stable and are mild in severity.      REVIEW OF SYSTEMS     Pertinent positives and negatives are stated within HPI, all other systems reviewed and are negative.    Past Medical History:  has a past medical history of Cervical fusion syndrome, Cervical radiculopathy, Cervical spinal stenosis, DDD (degenerative disc disease), Enlarged prostate, External carotid artery stenosis, Headache, History of colonic polyps, History of head injury, Hyperlipidemia, Low back

## 2025-05-23 ENCOUNTER — HOSPITAL ENCOUNTER (OUTPATIENT)
Dept: PHYSICAL THERAPY | Age: 65
Setting detail: THERAPIES SERIES
Discharge: HOME | End: 2025-05-23
Payer: MEDICARE

## 2025-05-23 PROCEDURE — 97161 PT EVAL LOW COMPLEX 20 MIN: CPT | Performed by: PHYSICAL THERAPIST

## 2025-05-23 ASSESSMENT — PAIN DESCRIPTION - ORIENTATION: ORIENTATION: RIGHT;LEFT

## 2025-05-23 ASSESSMENT — PAIN DESCRIPTION - PAIN TYPE: TYPE: CHRONIC PAIN

## 2025-05-23 ASSESSMENT — PAIN SCALES - GENERAL: PAINLEVEL_OUTOF10: 2

## 2025-05-23 ASSESSMENT — PAIN DESCRIPTION - LOCATION: LOCATION: NECK

## 2025-05-23 ASSESSMENT — PAIN DESCRIPTION - DESCRIPTORS: DESCRIPTORS: ACHING;BURNING;NUMBNESS;TINGLING

## 2025-05-23 NOTE — PROGRESS NOTES
Physical Therapy: Initial Evaluation    Patient: Javy Ca (64 y.o. male)   Examination Date: 2025  Plan of Care Certification Period: 2025 to        :  1960 ;    Confirmed: Yes MRN: [id number]  CSN: [id number]   Insurance: Payor: [facility] MEDICARE / Plan: [facility] MEDICARE COMPLETE / Product Type: *No Product type* /   Insurance ID: [id number] - (Medicare Managed) Secondary Insurance (if applicable):    Referring Physician: Jerel Agosto DO     PCP: Jacquie Daniel MD Visits to Date/Visits Approved:     No Show/Cancelled Appts:   /       Medical Diagnosis: Cervicalgia [M54.2]    Treatment Diagnosis:       PERTINENT MEDICAL HISTORY           Medical History: Chart Reviewed: Yes   Past Medical History:   Diagnosis Date    Cervical fusion syndrome     Cervical radiculopathy     Cervical spinal stenosis     severe    DDD (degenerative disc disease)     Enlarged prostate     External carotid artery stenosis 2018    Headache     History of colonic polyps     cscope 3/2022 showed no polyps    History of head injury     Hyperlipidemia     treated with diet before    Low back pain     Lumbar radiculopathy     Prominent abdominal aortic pulse 2018    Right rotator cuff tear     Syncope, near tilt table test neg 2011    felt light headed, unsteady gait, dizziness increased when patient went from standing to sitting. entire episode lasted about 30 min.     Surgical History:   Past Surgical History:   Procedure Laterality Date    ABDOMINAL ADHESION SURGERY      CERVICAL FUSION      ACDF C5-7 about 30 years ago    CERVICAL FUSION Bilateral 2022    EXPLORATION OF C5-C7 FUSION, C3-C4 AND C4-C5 ANTERIOR CERVICAL DISCECTOMY AND FUSION performed by Ana Saunders MD at [facility]    CLAVICLE SURGERY      COLONOSCOPY  2022    Dr. Santillan    COLONOSCOPY      approx    NECK SURGERY      PACEMAKER INSERTION Left 2023    [employer] Dual PPM  Dr. Rosales    ROTATOR CUFF REPAIR

## 2025-05-23 NOTE — PROGRESS NOTES
[facility]                Phone: [phone]   Fax: [phone]    Physical Therapy Daily Treatment Note  Date:  2025    Patient Name:  Javy Ca    :  1960  MRN: [id number]    Evaluating therapist:  DWAYNE Atkinson                 (25)  Restrictions/Precautions:    Diagnosis:  s/p cervical fusion  C3-5            (6/3/22)      Treatment Diagnosis:    Insurance/Certification information:  [employer] Medicare Dual Complete    cert dates:  25  to  25     ICD-10:  Referring Practitioner:  MYAH Agosto  Plan of care signed (Y/N):    Visit# / total visits:  -  Pain level: 2/10   Time In:    Time Out:      Subjective:      Exercises:  Exercise/Equipment Resistance/Repetitions Other comments   UBE             corner st     upper trap st       thoracic st     chest st            cervical flex/ext                  rot      shrugs     scap ret            pulleys for flex             H/M pulls        rows     hor abd                     Other Therapeutic Activities:      Home Exercise Program:  provided 25    Manual Treatments:      Modalities:  IFC/MH to neck/upper back     Timed Code Treatment Minutes:      Total Treatment Minutes:      Treatment/Activity Tolerance:  [] Patient tolerated treatment well [] Patient limited by fatique  [] Patient limited by pain  [] Patient limited by other medical complications  [] Other:     Prognosis: [] Good [] Fair  [] Poor    Patient Requires Follow-up: [] Yes  [] No    Plan:   [] Continue per plan of care [] Alter current plan (see comments)  [] Plan of care initiated [] Hold pending MD visit [] Discharge  Plan for Next Session:      See Weekly Progress Note: []  Yes  []  No  Next due:        Electronically signed by:  Jose Nance, IRMA

## 2025-05-27 ENCOUNTER — HOSPITAL ENCOUNTER (OUTPATIENT)
Dept: PHYSICAL THERAPY | Age: 65
Setting detail: THERAPIES SERIES
Discharge: HOME | End: 2025-05-27
Payer: MEDICARE

## 2025-05-27 PROCEDURE — 97110 THERAPEUTIC EXERCISES: CPT | Performed by: PHYSICAL THERAPIST

## 2025-05-27 PROCEDURE — G0283 ELEC STIM OTHER THAN WOUND: HCPCS | Performed by: PHYSICAL THERAPIST

## 2025-05-27 NOTE — PROGRESS NOTES
S:  pt presents to therapy for only visit of the week; at this time he reports that his neck/upper back; pain level given as 2/10 and is limiting in nature; sleep continues to be hampered; HEP going well per pt    O:  performed the exercises/treatments as written in the flowsheet for the week ending 5/30/25; initiated HEP for home management of condition; cervical AROM grossly 50%WNL for all ranges; B UE strength grossly 5/5 for all planes     A:  henrietta tx well; pt able to perform all requested tasks with good form and pacing noted; cervical AROM/B UE strength grossly stable since eval; movement remains slow/guarded across neck/upper back; endurance for all prolonged activities is GOOD-    P:  cont with POC of stretching/strengthening for neck/upper back with modalities as needed

## 2025-05-27 NOTE — PROGRESS NOTES
[facility]                Phone: [phone]   Fax: [phone]    Physical Therapy Daily Treatment Note  Date:  2025    Patient Name:  Javy Ca    :  1960  MRN: [id number]    Evaluating therapist:  DWAYNE Atkinson                 (25)  Restrictions/Precautions:    Diagnosis:  s/p cervical fusion  C3-5            (6/3/22)      Treatment Diagnosis:    Insurance/Certification information:  [place] Medicare Dual Complete    cert dates:  25  to  25     ICD-10:  M54.2  Referring Practitioner:  MYAH Agosto  Plan of care signed (Y/N):    Visit# / total visits:  -  Pain level: 210   Time In:  1059  Time Out:  1200    Subjective:      Exercises:  Exercise/Equipment Resistance/Repetitions Other comments   UBE  3 min F/B           corner st 3x20s    upper trap st   3x20s    thoracic st 3x20s    chest st 3x20s           cervical flex/ext 15x3s                 rot  15x3s    shrugs 15x3s    scap ret 15x3s           pulleys for flex  3 min           H/M pulls        rows     hor abd                     Other Therapeutic Activities:      Home Exercise Program:  provided 25    Manual Treatments:      Modalities:  IFC/MH to neck/upper back     Timed Code Treatment Minutes:      Total Treatment Minutes:      Treatment/Activity Tolerance:  [] Patient tolerated treatment well [] Patient limited by fatique  [] Patient limited by pain  [] Patient limited by other medical complications  [] Other:     Prognosis: [] Good [] Fair  [] Poor    Patient Requires Follow-up: [] Yes  [] No    Plan:   [] Continue per plan of care [] Alter current plan (see comments)  [] Plan of care initiated [] Hold pending MD visit [] Discharge  Plan for Next Session:      See Weekly Progress Note: []  Yes  []  No  Next due:        Electronically signed by:  Jose Nance PT

## 2025-05-30 ENCOUNTER — HOSPITAL ENCOUNTER (OUTPATIENT)
Dept: CARDIOLOGY | Age: 65
Discharge: HOME | End: 2025-05-30
Payer: MEDICARE

## 2025-05-30 ENCOUNTER — HOSPITAL ENCOUNTER (OUTPATIENT)
Dept: ULTRASOUND IMAGING | Age: 65
End: 2025-05-30
Attending: FAMILY MEDICINE
Payer: MEDICARE

## 2025-05-30 DIAGNOSIS — R10.11 RUQ ABDOMINAL PAIN: ICD-10-CM

## 2025-05-30 DIAGNOSIS — R09.89 PROMINENT ABDOMINAL AORTIC PULSE: ICD-10-CM

## 2025-05-30 LAB
VAS AORTA DIST AP: 2.08 CM
VAS AORTA DIST TR: 2.19 CM
VAS AORTA MID AP: 2.02 CM
VAS AORTA MID TRANS: 2 CM
VAS AORTA PROX AP: 2.52 CM
VAS AORTA PROX TR: 2.46 CM
VAS LEFT COM ILIAC AP: 1.36 CM
VAS LEFT COM ILIAC TRANS: 1.33 CM
VAS RIGHT COM ILIAC AP: 1.46 CM
VAS RIGHT COM ILIAC TRANS: 1.53 CM

## 2025-05-30 PROCEDURE — 76705 ECHO EXAM OF ABDOMEN: CPT

## 2025-05-30 PROCEDURE — 93976 VASCULAR STUDY: CPT | Performed by: SURGERY

## 2025-05-30 PROCEDURE — 93976 VASCULAR STUDY: CPT

## 2025-06-03 ENCOUNTER — HOSPITAL ENCOUNTER (OUTPATIENT)
Dept: PHYSICAL THERAPY | Age: 65
Setting detail: THERAPIES SERIES
Discharge: HOME | End: 2025-06-03
Payer: MEDICARE

## 2025-06-03 NOTE — PROGRESS NOTES
[facility]                Phone: [phone]  Fax: [phone]    Physical Therapy  Cancellation/No-show Note  Patient Name:  Javy Ca  :  1960   Date:  6/3/2025    For today's appointment patient:  [x]  Cancelled  []  Rescheduled appointment  []  No-show     Reason given by patient:  []  Patient ill  []  Conflicting appointment  []  No transportation    []  Conflict with work  [x]  No reason given  []  Other:     Comments:        Electronically signed by:  Jose Nance, PT

## 2025-06-04 ENCOUNTER — TELEPHONE (OUTPATIENT)
Dept: VASCULAR SURGERY | Age: 65
End: 2025-06-04

## 2025-06-04 ENCOUNTER — RESULTS FOLLOW-UP (OUTPATIENT)
Age: 65
End: 2025-06-04

## 2025-06-04 NOTE — TELEPHONE ENCOUNTER
Called to inform patient no change in diameter of aorta at 2.5 cm compared to 3 years ago,not to worry and keep the next appointment.

## 2025-06-05 ENCOUNTER — HOSPITAL ENCOUNTER (OUTPATIENT)
Dept: PHYSICAL THERAPY | Age: 65
Setting detail: THERAPIES SERIES
Discharge: HOME | End: 2025-06-05
Payer: MEDICARE

## 2025-06-05 PROCEDURE — G0283 ELEC STIM OTHER THAN WOUND: HCPCS | Performed by: PHYSICAL THERAPIST

## 2025-06-05 PROCEDURE — 97110 THERAPEUTIC EXERCISES: CPT | Performed by: PHYSICAL THERAPIST

## 2025-06-05 NOTE — PROGRESS NOTES
S:  pt presents to therapy for only visit of the week; at this time he reports that his neck/upper back seem to be loosening up; pain level given as 2/10 and is limiting in nature; sleep continues to be hampered; HEP going well per pt    O:  performed the exercises/treatments as written in the flowsheet for the week ending 6/6/25;  cervical AROM grossly 65%WNL for all ranges; B UE strength grossly 5/5 for all planes     A:  henrietta tx well; pt able to perform all requested tasks with good form and pacing noted; cervical AROM shows some overall improvement in both quantity and quality while B UE strength grossly stable since eval; movement seem smoother and less guarded;  endurance for all prolonged activities is GOOD-    P:  cont with POC of stretching/strengthening for neck/upper back with modalities as needed

## 2025-06-05 NOTE — PROGRESS NOTES
[facility]                Phone: [phone]   Fax: [phone]    Physical Therapy Daily Treatment Note  Date:  2025    Patient Name:  Javy Ca    :  1960  MRN: [id number]    Evaluating therapist:  DWAYNE Atkinson                 (25)  Restrictions/Precautions:    Diagnosis:  s/p cervical fusion  C3-5            (6/3/22)      Treatment Diagnosis:    Insurance/Certification information:  [facility] Medicare Dual Complete    cert dates:  25  to  25     ICD-10:  M54.2  Referring Practitioner:  MYAH Agosto  Plan of care signed (Y/N):    Visit# / total visits:  3/6-  Pain level: 2/10   Time In:  1123  Time Out:  1208    Subjective:      Exercises:  Exercise/Equipment Resistance/Repetitions Other comments   UBE  3 min F/B           corner st 3x20s    upper trap st   3x20s    thoracic st 3x20s    chest st 3x20s           cervical flex/ext 15x3s                 rot  15x3s    shrugs 15x3s    scap ret 15x3s           pulleys for flex  3 min           H/M pulls        rows     hor abd                     Other Therapeutic Activities:      Home Exercise Program:  provided 25    Manual Treatments:      Modalities:  IFC/MH to neck/upper back  x 15 min     Timed Code Treatment Minutes:      Total Treatment Minutes:      Treatment/Activity Tolerance:  [] Patient tolerated treatment well [] Patient limited by fatique  [] Patient limited by pain  [] Patient limited by other medical complications  [] Other:     Prognosis: [] Good [] Fair  [] Poor    Patient Requires Follow-up: [] Yes  [] No    Plan:   [] Continue per plan of care [] Alter current plan (see comments)  [] Plan of care initiated [] Hold pending MD visit [] Discharge  Plan for Next Session:      See Weekly Progress Note: []  Yes  []  No  Next due:        Electronically signed by:  Jose Nance, PT

## 2025-06-10 ENCOUNTER — HOSPITAL ENCOUNTER (OUTPATIENT)
Dept: PHYSICAL THERAPY | Age: 65
Setting detail: THERAPIES SERIES
Discharge: HOME | End: 2025-06-10
Payer: MEDICARE

## 2025-06-10 PROCEDURE — G0283 ELEC STIM OTHER THAN WOUND: HCPCS | Performed by: PHYSICAL THERAPIST

## 2025-06-10 PROCEDURE — 97110 THERAPEUTIC EXERCISES: CPT | Performed by: PHYSICAL THERAPIST

## 2025-06-10 PROCEDURE — 97530 THERAPEUTIC ACTIVITIES: CPT | Performed by: PHYSICAL THERAPIST

## 2025-06-10 NOTE — PROGRESS NOTES
[facility]                Phone: [phone]   Fax: [phone]    Physical Therapy Daily Treatment Note  Date:  6/10/2025    Patient Name:  Javy Ca    :  1960  MRN: [id number]    Evaluating therapist:  DWAYNE Atkinson                 (25)  Restrictions/Precautions:    Diagnosis:  s/p cervical fusion  C3-5            (6/3/22)      Treatment Diagnosis:    Insurance/Certification information:  [facility] Medicare Dual Complete    cert dates:  25  to  25     ICD-10:  M54.2  Referring Practitioner:  MYAH Agosto  Plan of care signed (Y/N):    Visit# / total visits:  -  Pain level: 2/10   Time In:  1100  Time Out:  1205    Subjective:      Exercises:  Exercise/Equipment Resistance/Repetitions Other comments   UBE  3 min F/B           corner st 3x20s    upper trap st   3x20s    thoracic st 3x20s    chest st 3x20s           cervical flex/ext 15x3s                 rot  15x3s    shrugs 15x3s    scap ret 15x3s           pulleys for flex  3 min           H/M pulls    15xblue    rows 15xblue    hor abd 15xblue                     Other Therapeutic Activities:      Home Exercise Program:  provided 25; 6/10/25    Manual Treatments:      Modalities:  IFC/MH to neck/upper back  x 15 min     Timed Code Treatment Minutes:      Total Treatment Minutes:      Treatment/Activity Tolerance:  [] Patient tolerated treatment well [] Patient limited by fatique  [] Patient limited by pain  [] Patient limited by other medical complications  [] Other:     Prognosis: [] Good [] Fair  [] Poor    Patient Requires Follow-up: [] Yes  [] No    Plan:   [] Continue per plan of care [] Alter current plan (see comments)  [] Plan of care initiated [] Hold pending MD visit [] Discharge  Plan for Next Session:      See Weekly Progress Note: []  Yes  []  No  Next due:        Electronically signed by:  Jose Nance PT

## 2025-06-16 NOTE — PROGRESS NOTES
[facility]  Outpatient Progress Note  Javy Ca  1960  Date of Service: 6/17/2025  PCP: Jacquie Daniel MD  Cardiologist: Dr. Whittington  Electrophysiologist: Jamarcus Rosales MD         Subjective: Javy Ca is seen for follow-up and management of 2:1 AVB and pacemaker in situ.     Javy Ca is a 64 y.o. who on 7/24/23 presented to the hospital for elective lumbar puncture with contrast injection for cervical CT myelogram and was noted to be bradycardic with HR of 40's. EKG showed 2 to 1 AV block with HR of 32 bpm. He underwent placement of a dual chamber pacemaker on 7/28/23. In November 2023 he complained of pain around the device however there were no sign of infection and the CXR was stable.  The patient was last seen by David RUIZ in February 2024, at which time the patient complained of intermittent episodes of chest pain.  He subsequently underwent a nuclear stress test in March 2024 which was overall negative. He presents today for follow up and reports feeling overall well.The patient offers no complaints from a device POV. The device site looks well healed and free from infection or erosion.  The patient denies any chest pain, dyspnea, palpitations, dizziness, syncope, orthopnea or paroxysmal nocturnal dyspnea. The patient continues to be followed remotely.    Patient Active Problem List   Diagnosis    Prominent abdominal aortic pulse    External carotid artery stenosis    Spinal stenosis in cervical region    Cervical disc disorder    Other hyperlipidemia    Multilevel degenerative disc disease    Second-degree heart block    Bradycardia    Cardiac pacemaker in situ    Spondylolisthesis of lumbar region    Lumbar radiculopathy    Chronic bilateral low back pain with left-sided sciatica    Cervicalgia    Cervical radiculitis    Chronic pain syndrome    History of colon polyps    SOB (shortness of

## 2025-06-17 ENCOUNTER — OFFICE VISIT (OUTPATIENT)
Dept: NON INVASIVE DIAGNOSTICS | Age: 65
End: 2025-06-17

## 2025-06-17 ENCOUNTER — HOSPITAL ENCOUNTER (OUTPATIENT)
Dept: PHYSICAL THERAPY | Age: 65
Setting detail: THERAPIES SERIES
Discharge: HOME | End: 2025-06-17
Payer: MEDICARE

## 2025-06-17 VITALS
WEIGHT: 237.8 LBS | RESPIRATION RATE: 16 BRPM | HEIGHT: 70 IN | OXYGEN SATURATION: 97 % | BODY MASS INDEX: 34.04 KG/M2 | HEART RATE: 75 BPM | DIASTOLIC BLOOD PRESSURE: 70 MMHG | SYSTOLIC BLOOD PRESSURE: 126 MMHG | TEMPERATURE: 98.9 F

## 2025-06-17 DIAGNOSIS — I49.9 IRREGULAR HEART BEAT: Primary | ICD-10-CM

## 2025-06-17 DIAGNOSIS — Z95.0 CARDIAC PACEMAKER IN SITU: ICD-10-CM

## 2025-06-17 PROCEDURE — 97110 THERAPEUTIC EXERCISES: CPT | Performed by: PHYSICAL THERAPIST

## 2025-06-17 PROCEDURE — 97530 THERAPEUTIC ACTIVITIES: CPT | Performed by: PHYSICAL THERAPIST

## 2025-06-17 PROCEDURE — G0283 ELEC STIM OTHER THAN WOUND: HCPCS | Performed by: PHYSICAL THERAPIST

## 2025-06-17 NOTE — PROGRESS NOTES
[facility]                Phone: [phone]   Fax: [phone]    Physical Therapy Daily Treatment Note  Date:  2025    Patient Name:  Javy Ca    :  1960  MRN: [id number]    Evaluating therapist:  DWAYNE Atkinson                 (25)  Restrictions/Precautions:    Diagnosis:  s/p cervical fusion  C3-5            (6/3/22)      Treatment Diagnosis:    Insurance/Certification information:  [facility] Medicare Dual Complete    cert dates:  25  to  25     ICD-10:  M54.2  Referring Practitioner:  MYAH Agosto  Plan of care signed (Y/N):    Visit# / total visits:  -  Pain level: 2/10   Time In:  1100  Time Out:  1200    Subjective:      Exercises:  Exercise/Equipment Resistance/Repetitions Other comments   UBE  3 min F/B           corner st 3x20s    upper trap st   3x20s    thoracic st 3x20s    chest st 3x20s           cervical flex/ext 15x3s                 rot  15x3s    shrugs 15x3s    scap ret 15x3s           pulleys for flex  3 min           H/M pulls    15xblue    rows 15xblue    hor abd 15xblue                     Other Therapeutic Activities:      Home Exercise Program:  provided 25; 6/10/25    Manual Treatments:      Modalities:  IFC/MH to neck/upper back  x 15 min     Timed Code Treatment Minutes:      Total Treatment Minutes:      Treatment/Activity Tolerance:  [] Patient tolerated treatment well [] Patient limited by fatique  [] Patient limited by pain  [] Patient limited by other medical complications  [] Other:     Prognosis: [] Good [] Fair  [] Poor    Patient Requires Follow-up: [] Yes  [] No    Plan:   [] Continue per plan of care [] Alter current plan (see comments)  [] Plan of care initiated [] Hold pending MD visit [] Discharge  Plan for Next Session:      See Weekly Progress Note: []  Yes  []  No  Next due:        Electronically signed by:  Jose Nance PT

## 2025-06-17 NOTE — PROGRESS NOTES
S:  pt presents to therapy for only visit of the week; at this time he reports that his neck/upper continue to feel looser and more mobile; pain level given as 2/10 and is limiting in nature; sleep continues to be hampered; HEP going well per pt    O:  performed the exercises/treatments as written in the flowsheet for the week ending 6/20/25;  cervical AROM grossly 75%WNL for all ranges; B UE strength grossly 5/5 for all planes     A:  henrietta tx well; pt able to perform all requested tasks with good form and pacing noted; cervical AROM again shows some overall improvement in both quantity and quality while B UE strength grossly stable since eval; movement seem smoother and less guarded;  endurance for all prolonged activities is GOOD    P:  cont with POC of stretching/strengthening for neck/upper back with modalities as needed

## 2025-06-25 DIAGNOSIS — E78.2 MIXED HYPERLIPIDEMIA: ICD-10-CM

## 2025-06-25 DIAGNOSIS — K21.9 GASTROESOPHAGEAL REFLUX DISEASE, UNSPECIFIED WHETHER ESOPHAGITIS PRESENT: ICD-10-CM

## 2025-06-25 DIAGNOSIS — M48.02 SPINAL STENOSIS IN CERVICAL REGION: ICD-10-CM

## 2025-06-25 NOTE — TELEPHONE ENCOUNTER
Name of Medication(s) Requested:  Requested Prescriptions     Pending Prescriptions Disp Refills    tiZANidine (ZANAFLEX) 4 MG tablet 90 tablet 1     Sig: Take 1 tablet by mouth every 6 hours as needed    pantoprazole (PROTONIX) 40 MG tablet 90 tablet 1     Sig: Take 1 tablet by mouth every morning (before breakfast)    gabapentin (NEURONTIN) 300 MG capsule 270 capsule 1     Sig: Take 1 capsule by mouth 3 times daily for 180 days.    atorvastatin (LIPITOR) 20 MG tablet 90 tablet 1     Sig: Take 1 tablet by mouth daily       Medication is on current medication list Yes    Dosage and directions were verified? Yes    Quantity verified: 90 day supply     Pharmacy Verified?  Yes    Last Appointment:  5/6/2025    Future appts:  Future Appointments   Date Time Provider Department Center   10/22/2025 11:30 AM Jerel Agosto DO [facility] Neurology -   4/15/2027  8:00 AM KRISTA ZAYAS  [address] [facility] Rad/Car   4/15/2027  8:30 AM Joseph Calabrese MD [facility]        (If no appt send self scheduling link. .REFILLAPPT)  Scheduling request sent?     [] Yes  [x] No    Does patient need updated?  [] Yes  [x] No

## 2025-06-26 RX ORDER — PANTOPRAZOLE SODIUM 40 MG/1
40 TABLET, DELAYED RELEASE ORAL
Qty: 90 TABLET | Refills: 3 | Status: SHIPPED | OUTPATIENT
Start: 2025-06-26

## 2025-06-26 RX ORDER — ATORVASTATIN CALCIUM 20 MG/1
20 TABLET, FILM COATED ORAL DAILY
Qty: 90 TABLET | Refills: 3 | Status: SHIPPED | OUTPATIENT
Start: 2025-06-26

## 2025-06-26 RX ORDER — GABAPENTIN 300 MG/1
300 CAPSULE ORAL 3 TIMES DAILY
Qty: 270 CAPSULE | Refills: 3 | Status: SHIPPED | OUTPATIENT
Start: 2025-06-26 | End: 2025-12-23

## 2025-07-01 ENCOUNTER — HOSPITAL ENCOUNTER (OUTPATIENT)
Dept: PHYSICAL THERAPY | Age: 65
Setting detail: THERAPIES SERIES
Discharge: HOME | End: 2025-07-01
Payer: MEDICARE

## 2025-07-01 PROCEDURE — 97110 THERAPEUTIC EXERCISES: CPT | Performed by: PHYSICAL THERAPIST

## 2025-07-01 PROCEDURE — 97530 THERAPEUTIC ACTIVITIES: CPT | Performed by: PHYSICAL THERAPIST

## 2025-07-01 PROCEDURE — G0283 ELEC STIM OTHER THAN WOUND: HCPCS | Performed by: PHYSICAL THERAPIST

## 2025-07-01 NOTE — PROGRESS NOTES
[facility]                Phone: [phone]   Fax: [phone]    Physical Therapy Daily Treatment Note  Date:  2025    Patient Name:  Javy Ca    :  1960  MRN: [id number]    Evaluating therapist:  DWAYNE Atkinson                 (25)  Restrictions/Precautions:    Diagnosis:  s/p cervical fusion  C3-5            (6/3/22)      Treatment Diagnosis:    Insurance/Certification information:  [facility] Medicare Dual Complete    cert dates:  25  to  25     ICD-10:  M54.2  Referring Practitioner:  MYAH Agosto  Plan of care signed (Y/N):    Visit# / total visits:    Pain level: 2/10   Time In:  1100  Time Out:  1151    Subjective:      Exercises:  Exercise/Equipment Resistance/Repetitions Other comments   UBE  3 min F/B           corner st 3x20s    upper trap st   3x20s    thoracic st 3x20s    chest st 3x20s           cervical flex/ext 15x3s                 rot  15x3s    shrugs 15x3s    scap ret 15x3s           pulleys for flex  3 min           H/M pulls    20xblue    rows 20xblue    hor abd 20xblue                     Other Therapeutic Activities:      Home Exercise Program:  provided 25; 6/10/25    Manual Treatments:      Modalities:  IFC/MH to neck/upper back  x 15 min     Timed Code Treatment Minutes:      Total Treatment Minutes:      Treatment/Activity Tolerance:  [] Patient tolerated treatment well [] Patient limited by fatique  [] Patient limited by pain  [] Patient limited by other medical complications  [] Other:     Prognosis: [] Good [] Fair  [] Poor    Patient Requires Follow-up: [] Yes  [] No    Plan:   [] Continue per plan of care [] Alter current plan (see comments)  [] Plan of care initiated [] Hold pending MD visit [] Discharge  Plan for Next Session:      See Weekly Progress Note: []  Yes  []  No  Next due:        Electronically signed by:  Jose Nance PT

## 2025-08-01 ENCOUNTER — TELEPHONE (OUTPATIENT)
Dept: PHARMACY | Facility: CLINIC | Age: 65
End: 2025-08-01

## 2025-08-01 NOTE — TELEPHONE ENCOUNTER
[facility]: ADHERENCE REVIEW  Identified care gap per [employer]: fills at [facility]: Statin adherence      ASSESSMENT  STATIN ADHERENCE    Insurance Records claims through 2025 (Prior Year PDC = not reported; YTD PDC = FIRST FILL; Potential Fail Date: 2025):   ATORVASTATIN TAB 20MG  last filled on 2025 for 90 day supply. Next refill due: 2025    Prescribed si tablet/capsule daily    Per Insurer Portal: last filled on 2025 for 90 day supply.     Per [facility]: last picked up on 2025 for 90 day supply.  Did not push Fill due to 1st fill and was returned to stock in  due to not being picked up    Lab Results   Component Value Date    CHOL 156 2024    TRIG 57 2024    HDL 45 2024     Lab Results   Component Value Date     (H) 2024      ALT   Date Value Ref Range Status   2024 20 0 - 40 U/L Final     AST   Date Value Ref Range Status   2024 40 (H) 0 - 39 U/L Final     The 10-year ASCVD risk score (Josh VELAZQUEZ, et al., 2019) is: 9.3%    Values used to calculate the score:      Age: 64 years      Sex: Male      Is Non- : Yes      Diabetic: No      Tobacco smoker: No      Systolic Blood Pressure: 126 mmHg      Is BP treated: No      HDL Cholesterol: 45 mg/dL      Total Cholesterol: 156 mg/dL     PLAN  Per insurer report, LIS-0 - co-pays are based on tiers and patient is subject to coverage gap.      The following are interventions that have been identified:   Patient OVERDUE refilling  ATORVASTATIN TAB 20MG and active on home medication list.     Attempting to reach patient to review.  Left message asking for return call. Z2t message sent to patient. Letter sent to patient.      Last Visit: 2025  Next Visit: None      Almita Poole MA  [facility]   [facility]  [phone] Option 1     For Pharmacy Admin Tracking